# Patient Record
Sex: FEMALE | URBAN - METROPOLITAN AREA
[De-identification: names, ages, dates, MRNs, and addresses within clinical notes are randomized per-mention and may not be internally consistent; named-entity substitution may affect disease eponyms.]

---

## 2018-10-31 ENCOUNTER — INPATIENT (INPATIENT)
Facility: HOSPITAL | Age: 73
LOS: 0 days | Discharge: HOME | DRG: 287 | End: 2018-11-01
Attending: INTERNAL MEDICINE | Admitting: INTERNAL MEDICINE
Payer: MEDICAID

## 2018-10-31 VITALS
HEART RATE: 80 BPM | SYSTOLIC BLOOD PRESSURE: 179 MMHG | DIASTOLIC BLOOD PRESSURE: 70 MMHG | OXYGEN SATURATION: 100 % | TEMPERATURE: 98 F | RESPIRATION RATE: 18 BRPM

## 2018-10-31 DIAGNOSIS — R07.9 CHEST PAIN, UNSPECIFIED: ICD-10-CM

## 2018-10-31 DIAGNOSIS — H27.113 SUBLUXATION OF LENS, BILATERAL: Chronic | ICD-10-CM

## 2018-10-31 DIAGNOSIS — Z90.49 ACQUIRED ABSENCE OF OTHER SPECIFIED PARTS OF DIGESTIVE TRACT: Chronic | ICD-10-CM

## 2018-10-31 LAB
ALBUMIN SERPL ELPH-MCNC: 4.2 G/DL — SIGNIFICANT CHANGE UP (ref 3.3–5)
ALP SERPL-CCNC: 86 U/L — SIGNIFICANT CHANGE UP (ref 40–120)
ALT FLD-CCNC: 24 U/L — SIGNIFICANT CHANGE UP (ref 10–45)
ANION GAP SERPL CALC-SCNC: 13 MMOL/L — SIGNIFICANT CHANGE UP (ref 5–17)
APTT BLD: 29.5 SEC — SIGNIFICANT CHANGE UP (ref 27.5–36.3)
AST SERPL-CCNC: 21 U/L — SIGNIFICANT CHANGE UP (ref 10–40)
BASOPHILS # BLD AUTO: 0.1 K/UL — SIGNIFICANT CHANGE UP (ref 0–0.2)
BASOPHILS NFR BLD AUTO: 0.8 % — SIGNIFICANT CHANGE UP (ref 0–2)
BILIRUB SERPL-MCNC: 0.4 MG/DL — SIGNIFICANT CHANGE UP (ref 0.2–1.2)
BLD GP AB SCN SERPL QL: NEGATIVE — SIGNIFICANT CHANGE UP
BUN SERPL-MCNC: 11 MG/DL — SIGNIFICANT CHANGE UP (ref 7–23)
CALCIUM SERPL-MCNC: 10.1 MG/DL — SIGNIFICANT CHANGE UP (ref 8.4–10.5)
CHLORIDE SERPL-SCNC: 96 MMOL/L — SIGNIFICANT CHANGE UP (ref 96–108)
CO2 SERPL-SCNC: 24 MMOL/L — SIGNIFICANT CHANGE UP (ref 22–31)
CREAT SERPL-MCNC: 0.75 MG/DL — SIGNIFICANT CHANGE UP (ref 0.5–1.3)
D DIMER BLD IA.RAPID-MCNC: 157 NG/ML DDU — SIGNIFICANT CHANGE UP
EOSINOPHIL # BLD AUTO: 0.2 K/UL — SIGNIFICANT CHANGE UP (ref 0–0.5)
EOSINOPHIL NFR BLD AUTO: 1.8 % — SIGNIFICANT CHANGE UP (ref 0–6)
GAS PNL BLDV: SIGNIFICANT CHANGE UP
GLUCOSE SERPL-MCNC: 183 MG/DL — HIGH (ref 70–99)
HCT VFR BLD CALC: 33.2 % — LOW (ref 34.5–45)
HGB BLD-MCNC: 11.1 G/DL — LOW (ref 11.5–15.5)
INR BLD: 1.04 RATIO — SIGNIFICANT CHANGE UP (ref 0.88–1.16)
LYMPHOCYTES # BLD AUTO: 2 K/UL — SIGNIFICANT CHANGE UP (ref 1–3.3)
LYMPHOCYTES # BLD AUTO: 20.6 % — SIGNIFICANT CHANGE UP (ref 13–44)
MCHC RBC-ENTMCNC: 26.9 PG — LOW (ref 27–34)
MCHC RBC-ENTMCNC: 33.4 GM/DL — SIGNIFICANT CHANGE UP (ref 32–36)
MCV RBC AUTO: 80.6 FL — SIGNIFICANT CHANGE UP (ref 80–100)
MONOCYTES # BLD AUTO: 0.7 K/UL — SIGNIFICANT CHANGE UP (ref 0–0.9)
MONOCYTES NFR BLD AUTO: 7.2 % — SIGNIFICANT CHANGE UP (ref 2–14)
NEUTROPHILS # BLD AUTO: 6.6 K/UL — SIGNIFICANT CHANGE UP (ref 1.8–7.4)
NEUTROPHILS NFR BLD AUTO: 69.6 % — SIGNIFICANT CHANGE UP (ref 43–77)
PLATELET # BLD AUTO: 363 K/UL — SIGNIFICANT CHANGE UP (ref 150–400)
POTASSIUM SERPL-MCNC: 3.9 MMOL/L — SIGNIFICANT CHANGE UP (ref 3.5–5.3)
POTASSIUM SERPL-SCNC: 3.9 MMOL/L — SIGNIFICANT CHANGE UP (ref 3.5–5.3)
PROT SERPL-MCNC: 7.9 G/DL — SIGNIFICANT CHANGE UP (ref 6–8.3)
PROTHROM AB SERPL-ACNC: 11.9 SEC — SIGNIFICANT CHANGE UP (ref 10–12.9)
RBC # BLD: 4.12 M/UL — SIGNIFICANT CHANGE UP (ref 3.8–5.2)
RBC # FLD: 13.8 % — SIGNIFICANT CHANGE UP (ref 10.3–14.5)
RH IG SCN BLD-IMP: POSITIVE — SIGNIFICANT CHANGE UP
SODIUM SERPL-SCNC: 133 MMOL/L — LOW (ref 135–145)
TROPONIN T, HIGH SENSITIVITY RESULT: <6 NG/L — SIGNIFICANT CHANGE UP (ref 0–51)
WBC # BLD: 9.5 K/UL — SIGNIFICANT CHANGE UP (ref 3.8–10.5)
WBC # FLD AUTO: 9.5 K/UL — SIGNIFICANT CHANGE UP (ref 3.8–10.5)

## 2018-10-31 PROCEDURE — 99285 EMERGENCY DEPT VISIT HI MDM: CPT

## 2018-10-31 PROCEDURE — 71045 X-RAY EXAM CHEST 1 VIEW: CPT | Mod: 26

## 2018-10-31 PROCEDURE — 99223 1ST HOSP IP/OBS HIGH 75: CPT

## 2018-10-31 RX ORDER — GLUCAGON INJECTION, SOLUTION 0.5 MG/.1ML
1 INJECTION, SOLUTION SUBCUTANEOUS ONCE
Qty: 0 | Refills: 0 | Status: DISCONTINUED | OUTPATIENT
Start: 2018-10-31 | End: 2018-11-01

## 2018-10-31 RX ORDER — DEXTROSE 50 % IN WATER 50 %
25 SYRINGE (ML) INTRAVENOUS ONCE
Qty: 0 | Refills: 0 | Status: DISCONTINUED | OUTPATIENT
Start: 2018-10-31 | End: 2018-11-01

## 2018-10-31 RX ORDER — INSULIN LISPRO 100/ML
VIAL (ML) SUBCUTANEOUS AT BEDTIME
Qty: 0 | Refills: 0 | Status: DISCONTINUED | OUTPATIENT
Start: 2018-10-31 | End: 2018-11-01

## 2018-10-31 RX ORDER — DEXTROSE 50 % IN WATER 50 %
15 SYRINGE (ML) INTRAVENOUS ONCE
Qty: 0 | Refills: 0 | Status: DISCONTINUED | OUTPATIENT
Start: 2018-10-31 | End: 2018-11-01

## 2018-10-31 RX ORDER — DEXTROSE 50 % IN WATER 50 %
12.5 SYRINGE (ML) INTRAVENOUS ONCE
Qty: 0 | Refills: 0 | Status: DISCONTINUED | OUTPATIENT
Start: 2018-10-31 | End: 2018-11-01

## 2018-10-31 RX ORDER — INSULIN LISPRO 100/ML
VIAL (ML) SUBCUTANEOUS
Qty: 0 | Refills: 0 | Status: DISCONTINUED | OUTPATIENT
Start: 2018-10-31 | End: 2018-11-01

## 2018-10-31 RX ORDER — ASPIRIN/CALCIUM CARB/MAGNESIUM 324 MG
162 TABLET ORAL ONCE
Qty: 0 | Refills: 0 | Status: COMPLETED | OUTPATIENT
Start: 2018-10-31 | End: 2018-10-31

## 2018-10-31 RX ORDER — SODIUM CHLORIDE 9 MG/ML
1000 INJECTION, SOLUTION INTRAVENOUS
Qty: 0 | Refills: 0 | Status: DISCONTINUED | OUTPATIENT
Start: 2018-10-31 | End: 2018-11-01

## 2018-10-31 RX ADMIN — Medication 162 MILLIGRAM(S): at 22:28

## 2018-10-31 NOTE — ED PROVIDER NOTE - ATTENDING CONTRIBUTION TO CARE
Private Physician PADDY OMALLEY  73y female pmh DM, Gerd, HTN,HLD,Hypothryodism,Glaucoma SP mary lou, sp miroslava cataract, Pt comes to ed complains of chest pain left chest rad to left shoulder, Onset this evening. Pain 6/10. Worse with walking with assocated weakness,and shortness of breath,intermittant palps,. No fever,chills, nausea and vomiting, abd pain, diaphroesis. Has had similar pains in past month referred to cards not yet seen. Pain worse with deep resp. palp. PE WDWN female awake alert normocephalic atraumatic chest clear anterior & posterior min ttp left chsetabd soft +bs no mas guarding neuro no  focal defects  Antonio Gardner MD, Facep

## 2018-10-31 NOTE — H&P ADULT - HISTORY OF PRESENT ILLNESS
74 yo woman with PMH of HTN, HLD, DMT2, hypothyroidism, GERD, glaucoma presenting with left sided chest pain with exertional shortness of breath. Patient states chest pain is left side describes as heaviness with radiation to the left arm and numbness of the left hand fingertips. Patient states last episode happened ~7:30 pm and was sitting at the time. Episode lasted ~10 minutes. Endorses associated shortness of breath. Denies pleuritic chest pain, diaphoresis, nausea/vomiting during event. Patient states that  has had symptoms of intermittent chest pain and dyspnea on exertion for ~1-2 months but has become more frequent. Also endorses feeling weaker and intermittent dizziness. Patient has and outpatient exercise stress test on ~4 days ago and did not receive results. She does endorse that she had to stop the test short.

## 2018-10-31 NOTE — H&P ADULT - NEUROLOGICAL DETAILS
sensation intact/cranial nerves intact/alert and oriented x 3/responds to pain/normal strength/responds to verbal commands

## 2018-10-31 NOTE — H&P ADULT - NSHPPHYSICALEXAM_GEN_ALL_CORE
Vital Signs Last 24 Hrs  T(C): 36.4 (31 Oct 2018 20:58), Max: 36.4 (31 Oct 2018 20:58)  T(F): 97.6 (31 Oct 2018 20:58), Max: 97.6 (31 Oct 2018 20:58)  HR: 80 (31 Oct 2018 20:58) (80 - 80)  BP: 179/70 (31 Oct 2018 20:58) (179/70 - 179/70)  BP(mean): --  RR: 18 (31 Oct 2018 20:58) (18 - 18)  SpO2: 100% (31 Oct 2018 20:58) (100% - 100%)

## 2018-10-31 NOTE — H&P ADULT - ASSESSMENT
72 yo woman with PMH of HTN, HLD, DMT2, hypothyroidism, GERD, glaucoma presenting with left sided chest pain with exertional shortness of breath.

## 2018-10-31 NOTE — ED ADULT NURSE NOTE - NSIMPLEMENTINTERV_GEN_ALL_ED
Implemented All Universal Safety Interventions:  [place] to call system. Call bell, personal items and telephone within reach. Instruct patient to call for assistance. Room bathroom lighting operational. Non-slip footwear when patient is off stretcher. Physically safe environment: no spills, clutter or unnecessary equipment. Stretcher in lowest position, wheels locked, appropriate side rails in place.

## 2018-10-31 NOTE — ED PROVIDER NOTE - PHYSICAL EXAMINATION
EM PGY1 Nicole Vigil MD:   CONSTITUTIONAL: Nontoxic, well nourished, well developed, elderly female, resting comfortably flat in stretcher in no acute distress  HEAD: Normocephalic; atraumatic  EYES: Normal inspection, EOMI  ENMT: External appears normal; normal oropharynx  NECK: Supple; non-tender; no cervical lymphadenopathy  CARD: RRR; no audible murmurs, rubs, or gallops  RESP: No respiratory distress, lungs ctab/l  ABD: Soft, non-distended; non-tender; no rebound or guarding  EXT: No LE pitting edema or calf tenderness; distal pulses intact with good capillary refill  SKIN: Warm, dry, intact  NEURO: aaox3, 5/5 strength b/l UE and LE, no gross motor or sensory defects noted

## 2018-10-31 NOTE — ED PROVIDER NOTE - MEDICAL DECISION MAKING DETAILS
CP ro acs check ekg, labs trop, xry, re-assess  Antonio Gardner MD, Facep CP ro acs check ekg, labs trop, xry, re-assess  Antonio Gardner MD, Facep    EM PGY1 Nicole Vigil MD: 72 yo female with PMH of DM, HLD, HTN, GERD, hypothyroidism who presents with chest pain for 3 months. Pt is hypertensive in ED, afebrile, not tachycardic. EKG normal. Pt has cardiac risk factors with concerning CP and exertional dyspnea. Low risk for PE. Plan: labs, troponin, CXR, d-dimer, aspirin

## 2018-10-31 NOTE — ED PROVIDER NOTE - PROGRESS NOTE DETAILS
Discussed with Dr Persaud pt was planning to have cath at [facility] next week. Req tba to him and he will consult cards  Antonio Gardner MD, [facility]

## 2018-10-31 NOTE — H&P ADULT - PROBLEM SELECTOR PLAN 2
Per patient reported last HgbA1c 8.1. Yet has noticed that glucose levels at home has been elevated.   Hold oral medications  Corrective SSI  Monitor FS  Hypoglycemia protocol  Recheck A1c

## 2018-10-31 NOTE — ED PROVIDER NOTE - OBJECTIVE STATEMENT
EM PGY1 Nicole Vigil MD: 72 yo female with PMH of DM, HLD, HTN, GERD, hypothyroidism who presents with chest pain for 3 months. CP is in left side, 7/10 worsening today from 5/10 normally, heaviness, radiates to the left arm. Pt also endorses exertional SOB. worse today, with fatigue, dizziness, weakness. Pt did not take baby asa today. Father  at 45 from possible MI. Never had stress testing before. No diaphoresis, N/V, syncope, irregular rhythm, palpitations, or focal neuro deficit, recent travel, leg swelling.     PCP: Young Persaud

## 2018-10-31 NOTE — ED ADULT NURSE NOTE - OBJECTIVE STATEMENT
73 y.o F presents to the ED from home c/o chest pain. Patient is awake, alert and speaking coherently. As per patient she has had left sided chest pain that sometimes radiates down her left arm for approximately 1 month; states she sometimes feels nauseous. patient reports going to her PCP but has not yet follow up with cardiology. Patient presents A&ox3, afebrile and ambulatory; denies numbness and tingling, endorses dizziness but denies headache, denies SOB, lungs clear; abdomen soft, nontender and nondistended, denies vomiting and diarrhea.

## 2018-10-31 NOTE — H&P ADULT - RS GEN PE MLT RESP DETAILS PC
clear to auscultation bilaterally/no rales/airway patent/breath sounds equal/good air movement/no rhonchi/no wheezes

## 2018-10-31 NOTE — H&P ADULT - NSHPLABSRESULTS_GEN_ALL_CORE
Personally reviewed labs and noted in detail below    Personally reviewed EKG SR 73 no appreciable ST segment changes    Personally reviewed CXR: no appreciable consolidation or signs of pulm edema

## 2018-10-31 NOTE — H&P ADULT - PROBLEM SELECTOR PLAN 1
Patient currently without symptoms. Currently CE negative and EKG without acute changes   Concern for abnormal stress test.  Per Dr. Gardner (of ED): spoke with Dr. Persaud and patient planning to have cath at [facility] next week. Dr. Persaud will consult cards  Monitor on tele  Trend CE  Check TTE  C/W Aspirin 81  Check A1c, Lipid panel TSH

## 2018-10-31 NOTE — ED PROVIDER NOTE - PMH
DM (diabetes mellitus)    GERD (gastroesophageal reflux disease)    Glaucoma    HLD (hyperlipidemia)    HTN (hypertension)    Hypothyroid

## 2018-10-31 NOTE — ED PROVIDER NOTE - NS ED ROS FT
EM PGY1 Nicole Vigil MD:   General: denies fever, chills  HENT: denies nasal congestion, sore throat, rhinorrhea  Eyes: denies vision changes  CV: +chest pain  Resp: +difficulty breathing, denies cough  Abdominal: denies nausea, vomiting, diarrhea, abdominal pain, blood in stool, dark stool  : denies pain with urination  MSK: denies recent trauma  Neuro: denies headaches, numbness, tingling, +dizziness,+ lightheadedness.  Skin: denies new rashes  Endocrine: denies recent weight loss

## 2018-10-31 NOTE — H&P ADULT - ATTENDING COMMENTS
Dr. Young Persaud accepted patient's case from the ED and requested in house hospitalist team to complete admission. Patient was previously unknown to me. Patient was assigned to me by hospitalist in charge. My involvement in this case consisted of initial history, physical and management plan. Dr. Persaud to assume care in AM. Case discussed in detail with overnight medicine NP/PA Dr. Young Persaud accepted patient's case from the ED and requested in house hospitalist team to complete admission. Patient was previously unknown to me. Patient was assigned to me by hospitalist in charge. My involvement in this case consisted of initial history, physical and management plan. Dr. Persaud to assume care in AM. Case discussed in detail with overnight medicine NP/PA, [id number]

## 2018-11-01 ENCOUNTER — INBOUND DOCUMENT (OUTPATIENT)
Age: 73
End: 2018-11-01

## 2018-11-01 ENCOUNTER — TRANSCRIPTION ENCOUNTER (OUTPATIENT)
Age: 73
End: 2018-11-01

## 2018-11-01 VITALS
RESPIRATION RATE: 18 BRPM | TEMPERATURE: 98 F | DIASTOLIC BLOOD PRESSURE: 55 MMHG | SYSTOLIC BLOOD PRESSURE: 123 MMHG | HEART RATE: 64 BPM | OXYGEN SATURATION: 100 %

## 2018-11-01 DIAGNOSIS — K21.9 GASTRO-ESOPHAGEAL REFLUX DISEASE WITHOUT ESOPHAGITIS: ICD-10-CM

## 2018-11-01 DIAGNOSIS — H40.9 UNSPECIFIED GLAUCOMA: ICD-10-CM

## 2018-11-01 DIAGNOSIS — E78.5 HYPERLIPIDEMIA, UNSPECIFIED: ICD-10-CM

## 2018-11-01 DIAGNOSIS — Z29.9 ENCOUNTER FOR PROPHYLACTIC MEASURES, UNSPECIFIED: ICD-10-CM

## 2018-11-01 DIAGNOSIS — E11.9 TYPE 2 DIABETES MELLITUS WITHOUT COMPLICATIONS: ICD-10-CM

## 2018-11-01 DIAGNOSIS — R07.9 CHEST PAIN, UNSPECIFIED: ICD-10-CM

## 2018-11-01 DIAGNOSIS — I10 ESSENTIAL (PRIMARY) HYPERTENSION: ICD-10-CM

## 2018-11-01 DIAGNOSIS — E03.9 HYPOTHYROIDISM, UNSPECIFIED: ICD-10-CM

## 2018-11-01 LAB
ANION GAP SERPL CALC-SCNC: 12 MMOL/L — SIGNIFICANT CHANGE UP (ref 5–17)
BUN SERPL-MCNC: 11 MG/DL — SIGNIFICANT CHANGE UP (ref 7–23)
CALCIUM SERPL-MCNC: 9.1 MG/DL — SIGNIFICANT CHANGE UP (ref 8.4–10.5)
CHLORIDE SERPL-SCNC: 103 MMOL/L — SIGNIFICANT CHANGE UP (ref 96–108)
CHOLEST SERPL-MCNC: 65 MG/DL — SIGNIFICANT CHANGE UP (ref 10–199)
CK MB CFR SERPL CALC: 1 NG/ML — SIGNIFICANT CHANGE UP (ref 0–3.8)
CK SERPL-CCNC: 33 U/L — SIGNIFICANT CHANGE UP (ref 25–170)
CO2 SERPL-SCNC: 21 MMOL/L — LOW (ref 22–31)
CREAT SERPL-MCNC: 0.67 MG/DL — SIGNIFICANT CHANGE UP (ref 0.5–1.3)
GLUCOSE BLDC GLUCOMTR-MCNC: 105 MG/DL — HIGH (ref 70–99)
GLUCOSE BLDC GLUCOMTR-MCNC: 162 MG/DL — HIGH (ref 70–99)
GLUCOSE BLDC GLUCOMTR-MCNC: 191 MG/DL — HIGH (ref 70–99)
GLUCOSE SERPL-MCNC: 113 MG/DL — HIGH (ref 70–99)
HBA1C BLD-MCNC: 7.8 % — HIGH (ref 4–5.6)
HCT VFR BLD CALC: 30.1 % — LOW (ref 34.5–45)
HDLC SERPL-MCNC: 26 MG/DL — LOW
HGB BLD-MCNC: 9.9 G/DL — LOW (ref 11.5–15.5)
LIPID PNL WITH DIRECT LDL SERPL: 22 MG/DL — SIGNIFICANT CHANGE UP
MAGNESIUM SERPL-MCNC: 1.6 MG/DL — SIGNIFICANT CHANGE UP (ref 1.6–2.6)
MCHC RBC-ENTMCNC: 26.4 PG — LOW (ref 27–34)
MCHC RBC-ENTMCNC: 32.9 GM/DL — SIGNIFICANT CHANGE UP (ref 32–36)
MCV RBC AUTO: 80.3 FL — SIGNIFICANT CHANGE UP (ref 80–100)
PHOSPHATE SERPL-MCNC: 3.9 MG/DL — SIGNIFICANT CHANGE UP (ref 2.5–4.5)
PLATELET # BLD AUTO: 296 K/UL — SIGNIFICANT CHANGE UP (ref 150–400)
POTASSIUM SERPL-MCNC: 4 MMOL/L — SIGNIFICANT CHANGE UP (ref 3.5–5.3)
POTASSIUM SERPL-SCNC: 4 MMOL/L — SIGNIFICANT CHANGE UP (ref 3.5–5.3)
RBC # BLD: 3.75 M/UL — LOW (ref 3.8–5.2)
RBC # FLD: 15.6 % — HIGH (ref 10.3–14.5)
SODIUM SERPL-SCNC: 136 MMOL/L — SIGNIFICANT CHANGE UP (ref 135–145)
TOTAL CHOLESTEROL/HDL RATIO MEASUREMENT: 2.5 RATIO — LOW (ref 3.3–7.1)
TRIGL SERPL-MCNC: 87 MG/DL — SIGNIFICANT CHANGE UP (ref 10–149)
TROPONIN T, HIGH SENSITIVITY RESULT: <6 NG/L — SIGNIFICANT CHANGE UP (ref 0–51)
TROPONIN T, HIGH SENSITIVITY RESULT: <6 NG/L — SIGNIFICANT CHANGE UP (ref 0–51)
TSH SERPL-MCNC: 5.58 UIU/ML — HIGH (ref 0.27–4.2)
WBC # BLD: 6.6 K/UL — SIGNIFICANT CHANGE UP (ref 3.8–10.5)
WBC # FLD AUTO: 6.6 K/UL — SIGNIFICANT CHANGE UP (ref 3.8–10.5)

## 2018-11-01 PROCEDURE — C1894: CPT

## 2018-11-01 PROCEDURE — 84100 ASSAY OF PHOSPHORUS: CPT

## 2018-11-01 PROCEDURE — 99285 EMERGENCY DEPT VISIT HI MDM: CPT

## 2018-11-01 PROCEDURE — 93306 TTE W/DOPPLER COMPLETE: CPT

## 2018-11-01 PROCEDURE — 86850 RBC ANTIBODY SCREEN: CPT

## 2018-11-01 PROCEDURE — 84484 ASSAY OF TROPONIN QUANT: CPT

## 2018-11-01 PROCEDURE — 82553 CREATINE MB FRACTION: CPT

## 2018-11-01 PROCEDURE — C1887: CPT

## 2018-11-01 PROCEDURE — 80061 LIPID PANEL: CPT

## 2018-11-01 PROCEDURE — 93458 L HRT ARTERY/VENTRICLE ANGIO: CPT

## 2018-11-01 PROCEDURE — 82330 ASSAY OF CALCIUM: CPT

## 2018-11-01 PROCEDURE — 86901 BLOOD TYPING SEROLOGIC RH(D): CPT

## 2018-11-01 PROCEDURE — 85379 FIBRIN DEGRADATION QUANT: CPT

## 2018-11-01 PROCEDURE — 80048 BASIC METABOLIC PNL TOTAL CA: CPT

## 2018-11-01 PROCEDURE — 83735 ASSAY OF MAGNESIUM: CPT

## 2018-11-01 PROCEDURE — 93306 TTE W/DOPPLER COMPLETE: CPT | Mod: 26

## 2018-11-01 PROCEDURE — 84295 ASSAY OF SERUM SODIUM: CPT

## 2018-11-01 PROCEDURE — 80053 COMPREHEN METABOLIC PANEL: CPT

## 2018-11-01 PROCEDURE — 71045 X-RAY EXAM CHEST 1 VIEW: CPT

## 2018-11-01 PROCEDURE — 85610 PROTHROMBIN TIME: CPT

## 2018-11-01 PROCEDURE — 85027 COMPLETE CBC AUTOMATED: CPT

## 2018-11-01 PROCEDURE — 99152 MOD SED SAME PHYS/QHP 5/>YRS: CPT

## 2018-11-01 PROCEDURE — 85730 THROMBOPLASTIN TIME PARTIAL: CPT

## 2018-11-01 PROCEDURE — 99254 IP/OBS CNSLTJ NEW/EST MOD 60: CPT

## 2018-11-01 PROCEDURE — 93458 L HRT ARTERY/VENTRICLE ANGIO: CPT | Mod: 26,GC

## 2018-11-01 PROCEDURE — 82550 ASSAY OF CK (CPK): CPT

## 2018-11-01 PROCEDURE — 82803 BLOOD GASES ANY COMBINATION: CPT

## 2018-11-01 PROCEDURE — 82565 ASSAY OF CREATININE: CPT

## 2018-11-01 PROCEDURE — 82435 ASSAY OF BLOOD CHLORIDE: CPT

## 2018-11-01 PROCEDURE — 82947 ASSAY GLUCOSE BLOOD QUANT: CPT

## 2018-11-01 PROCEDURE — 82962 GLUCOSE BLOOD TEST: CPT

## 2018-11-01 PROCEDURE — 83036 HEMOGLOBIN GLYCOSYLATED A1C: CPT

## 2018-11-01 PROCEDURE — 86900 BLOOD TYPING SEROLOGIC ABO: CPT

## 2018-11-01 PROCEDURE — 83605 ASSAY OF LACTIC ACID: CPT

## 2018-11-01 PROCEDURE — 99152 MOD SED SAME PHYS/QHP 5/>YRS: CPT | Mod: GC

## 2018-11-01 PROCEDURE — 84443 ASSAY THYROID STIM HORMONE: CPT

## 2018-11-01 PROCEDURE — 84132 ASSAY OF SERUM POTASSIUM: CPT

## 2018-11-01 PROCEDURE — 85014 HEMATOCRIT: CPT

## 2018-11-01 PROCEDURE — C1769: CPT

## 2018-11-01 RX ORDER — FAMOTIDINE 10 MG/ML
1 INJECTION INTRAVENOUS
Qty: 0 | Refills: 0 | COMMUNITY

## 2018-11-01 RX ORDER — AMLODIPINE BESYLATE 2.5 MG/1
10 TABLET ORAL DAILY
Qty: 0 | Refills: 0 | Status: DISCONTINUED | OUTPATIENT
Start: 2018-11-01 | End: 2018-11-01

## 2018-11-01 RX ORDER — LATANOPROST 0.05 MG/ML
1 SOLUTION/ DROPS OPHTHALMIC; TOPICAL AT BEDTIME
Qty: 0 | Refills: 0 | Status: DISCONTINUED | OUTPATIENT
Start: 2018-11-01 | End: 2018-11-01

## 2018-11-01 RX ORDER — ATENOLOL 25 MG/1
50 TABLET ORAL DAILY
Qty: 0 | Refills: 0 | Status: DISCONTINUED | OUTPATIENT
Start: 2018-11-01 | End: 2018-11-01

## 2018-11-01 RX ORDER — FAMOTIDINE 10 MG/ML
40 INJECTION INTRAVENOUS DAILY
Qty: 0 | Refills: 0 | Status: DISCONTINUED | OUTPATIENT
Start: 2018-11-01 | End: 2018-11-01

## 2018-11-01 RX ORDER — ASPIRIN/CALCIUM CARB/MAGNESIUM 324 MG
81 TABLET ORAL DAILY
Qty: 0 | Refills: 0 | Status: DISCONTINUED | OUTPATIENT
Start: 2018-11-01 | End: 2018-11-01

## 2018-11-01 RX ORDER — METFORMIN HYDROCHLORIDE 850 MG/1
1 TABLET ORAL
Qty: 0 | Refills: 0 | COMMUNITY

## 2018-11-01 RX ORDER — ATORVASTATIN CALCIUM 80 MG/1
10 TABLET, FILM COATED ORAL AT BEDTIME
Qty: 0 | Refills: 0 | Status: DISCONTINUED | OUTPATIENT
Start: 2018-11-01 | End: 2018-11-01

## 2018-11-01 RX ADMIN — FAMOTIDINE 40 MILLIGRAM(S): 10 INJECTION INTRAVENOUS at 09:48

## 2018-11-01 RX ADMIN — Medication 1: at 11:24

## 2018-11-01 RX ADMIN — Medication 81 MILLIGRAM(S): at 09:48

## 2018-11-01 NOTE — DISCHARGE NOTE ADULT - PLAN OF CARE
Patient will be chest pain free Low salt, low fat diet.   Weight management.   Take medications as prescribed.    No smoking.  Follow up appointments with your doctor(s)  as instruced. Patient's glucose will be controlled Continue to follow with your primary care MD or your endocrinologist.  Follow a heart healthy diabetic diet. If you check your fingerstick glucose at home, call your MD if it is greater than 250mg/dL on 2 occasions or less than 100mg/dL on 2 occasions. Know signs of low blood sugar, such as: dizziness, shakiness, sweating, confusion, hunger, nervousness-drink 4 ounces apple juice if occurs and call your doctor. Know early signs of high blood sugar, such as: frequent urination, increased thirst, blurry vision, fatigue, headache - call your doctor if this occurs. Follow with other practitioners to care for your diabetes, such as ophthamologist and podiatrist. LDL<70 Goal is to keep LDL<70. Continue with your cholesterol medications as prescribed. Eat a heart healthy diet that is low in saturated fats and salt, and includes whole grains, fruits, vegetables and lean protein; exercise regularly (consult with your physician or cardiologist first); maintain a heart healthy weight; if you smoke - quit (A resource to help you stop smoking is the [facility] Tobacco Control – phone number [phone].). Continue to follow with your primary physician or cardiologist. Your blood pressure will be controlled. Continue with your blood pressure medications; eat a heart healthy diet with low salt diet; exercise regularly (consult with your physician or cardiologist first); maintain a heart healthy weight; if you smoke - quit (A resource to help you stop smoking is the [facility] for Tobacco Control – phone number [phone].); include healthy ways to manage stress. Continue to follow with your primary care physician or cardiologist.

## 2018-11-01 NOTE — DISCHARGE NOTE ADULT - CARE PROVIDER_API CALL
Jose Carlos Brown), Cardiovascular Disease; Interventional Cardiology  [address] [place] [address]  Phone: [phone]  Fax: [phone]    Young Persaud), Internal Medicine  [address] [place] [address]  Phone: [phone]  Fax: [phone]

## 2018-11-01 NOTE — DISCHARGE NOTE ADULT - PATIENT PORTAL LINK FT
You can access the [facility] Patient Portal, offered by [facility], by registering with the following website: [email or web address]

## 2018-11-01 NOTE — DISCHARGE NOTE ADULT - MEDICATION SUMMARY - MEDICATIONS TO TAKE
I will START or STAY ON the medications listed below when I get home from the hospital:    Aspirin Enteric Coated 81 mg oral delayed release tablet  -- 1 tab(s) by mouth once a day  -- Indication: For Chest pain    metFORMIN 850 mg oral tablet  -- 1 tab(s) by mouth 2 times a day, Restart on 11/3  -- Indication: For DM (diabetes mellitus)    glimepiride 2 mg oral tablet  -- 1-2 tab(s) by mouth once a day  -- Indication: For DM (diabetes mellitus)    atorvastatin 10 mg oral tablet  -- 1 tab(s) by mouth once a day  -- Indication: For HLD (hyperlipidemia)    atenolol 50 mg oral tablet  -- 1 tab(s) by mouth once a day  -- Indication: For HTN (hypertension)    amLODIPine 10 mg oral tablet  -- 1 tab(s) by mouth once a day  -- Indication: For HTN (hypertension)    Liquitears preserved ophthalmic solution  -- 1 drop(s) to each affected eye 3 times a day, As Needed  -- Indication: For Ophthalmic    latanoprost 0.005% ophthalmic solution  -- 1 drop(s) to each affected eye once a day (in the evening)  -- Indication: For Ophthalmic    Vitamin C 500 mg oral tablet  -- 1 tab(s) by mouth once a day  -- Indication: For Supplements    Vitamin B12 1000 mcg oral tablet  -- 1 tab(s) by mouth once a day  -- Indication: For Supplements

## 2018-11-01 NOTE — CONSULT NOTE ADULT - SUBJECTIVE AND OBJECTIVE BOX
MRN-[id number]    CHIEF COMPLAINT:  Chest Pain    HISTORY OF PRESENT ILLNESS:  ELLEN MOORE is a 73y Female patient with past medical history of HTN, HLD, DMT2, hypothyroidism, GERD, glaucoma presenting with left sided chest pain with exertional shortness of breath. Her functional status has declined over the past several month limited by shortness of breath. Her chest discomfort is sharp in quality and worsens iwth [id number]. She had a stress test within the last few days that was reportedly positive although the report itself is not available to me for review. She was planned for a cardiac cath in the outpatient setting, but given that she has worsening chest discomfort, she presented to the emergency department. Cardiac enzymes negative. ECG benign. Cardiology consulted for further management.     Allergies  Drug Allergies Not Recorded  eggs (Other)    PAST MEDICAL & SURGICAL HISTORY:  Glaucoma  GERD (gastroesophageal reflux disease)  Hypothyroid  HLD (hyperlipidemia)  HTN (hypertension)  DM (diabetes mellitus)  Subluxed cataract of both eyes  Status post cholecystectomy    FAMILY HISTORY:  Family history of heart attack (Father): father    SOCIAL HISTORY:    Non-smoker    REVIEW OF SYSTEMS:  CONSTITUTIONAL: No fever, weight loss, Positive fatigue  EYES: No eye pain, visual disturbances  ENMT:  No difficulty hearing, tinnitus, vertigo; No sinus or throat pain  NECK: No pain or stiffness  RESPIRATORY: No cough, wheezing, chills or hemoptysis; Positive Shortness of Breath  CARDIOVASCULAR: No palpitations, passing out, dizziness, or leg swelling. Positive chest pain  GASTROINTESTINAL: No abdominal or epigastric pain. No nausea, vomiting, or hematemesis  NEUROLOGICAL: No headaches, memory loss  SKIN: No itching, burning, rashes, or lesions   LYMPH Nodes: No enlarged glands  MUSCULOSKELETAL: No joint pain or swelling  PSYCHIATRIC: No depression, anxiety, mood swings, or difficulty sleeping  HEME/LYMPH: No easy bruising, or bleeding gums    I&O's Summary    31 Oct 2018 07:01  -  01 Nov 2018 06:30  --------------------------------------------------------  IN: 180 mL / OUT: 0 mL / NET: 180 mL    PHYSICAL EXAM:  Vital Signs Last 24 Hrs  T(C): 36.7 (01 Nov 2018 05:30), Max: 36.7 (01 Nov 2018 01:34)  T(F): 98.1 (01 Nov 2018 05:30), Max: 98.1 (01 Nov 2018 05:30)  HR: 57 (01 Nov 2018 05:30) (57 - 80)  BP: 108/56 (01 Nov 2018 05:30) (108/56 - 179/70)  RR: 18 (01 Nov 2018 05:30) (16 - 18)  SpO2: 100% (01 Nov 2018 05:30) (100% - 100%)    Appearance: Normal	  HEENT:   Normal oral mucosa	  Lymphatic: No lymphadenopathy  Cardiovascular: Normal S1 S2, No JVD, No murmurs, No edema  Respiratory: Lungs clear to auscultation	  Psychiatry: A & O x 3  Gastrointestinal:  Soft, Non-tender  Skin: No rashes, No ecchymoses, No cyanosis	  Neurologic: Non-focal  Extremities: No clubbing, cyanosis or edema  Vascular: Peripheral pulses palpable 2+ bilaterally    MEDICATIONS:  MEDICATIONS  (STANDING):  amLODIPine   Tablet 10 milliGRAM(s) Oral daily  aspirin enteric coated 81 milliGRAM(s) Oral daily  ATENolol  Tablet 50 milliGRAM(s) Oral daily  atorvastatin 10 milliGRAM(s) Oral at bedtime  dextrose 5%. 1000 milliLiter(s) (50 mL/Hr) IV Continuous <Continuous>  dextrose 50% Injectable 12.5 Gram(s) IV Push once  dextrose 50% Injectable 25 Gram(s) IV Push once  dextrose 50% Injectable 25 Gram(s) IV Push once  famotidine    Tablet 40 milliGRAM(s) Oral daily  insulin lispro (HumaLOG) corrective regimen sliding scale   SubCutaneous three times a day before meals  insulin lispro (HumaLOG) corrective regimen sliding scale   SubCutaneous at bedtime  latanoprost 0.005% Ophthalmic Solution 1 Drop(s) Both EYES at bedtime    MEDICATIONS  (PRN):  artificial tears (preservative free) Ophthalmic Solution 1 Drop(s) Both EYES two times a day PRN Dry Eyes  dextrose 40% Gel 15 Gram(s) Oral once PRN Blood Glucose LESS THAN 70 milliGRAM(s)/deciliter  glucagon  Injectable 1 milliGRAM(s) IntraMuscular once PRN Glucose LESS THAN 70 milligrams/deciliter    LABS:	 	  CBC Full  -  ( 31 Oct 2018 22:30 )  WBC Count : 9.5 K/uL  Hemoglobin : 11.1 g/dL  Hematocrit : 33.2 %  Platelet Count - Automated : 363 K/uL  Mean Cell Volume : 80.6 fl  Mean Cell Hemoglobin : 26.9 pg  Mean Cell Hemoglobin Concentration : 33.4 gm/dL  Auto Neutrophil # : 6.6 K/uL  Auto Lymphocyte # : 2.0 K/uL  Auto Monocyte # : 0.7 K/uL  Auto Eosinophil # : 0.2 K/uL  Auto Basophil # : 0.1 K/uL  Auto Neutrophil % : 69.6 %  Auto Lymphocyte % : 20.6 %  Auto Monocyte % : 7.2 %  Auto Eosinophil % : 1.8 %  Auto Basophil % : 0.8 %    10-31    133<L>  |  96  |  11  ----------------------------<  183<H>  3.9   |  24  |  0.75    Ca    10.1      31 Oct 2018 22:30    TPro  7.9  /  Alb  4.2  /  TBili  0.4  /  DBili  x   /  AST  21  /  ALT  24  /  AlkPhos  86  10-31    PT/INR - ( 31 Oct 2018 22:30 )   PT: 11.9 sec;   INR: 1.04 ratio      PTT - ( 31 Oct 2018 22:30 )  PTT:29.5 sec    TELEMETRY: NSR    ECG: NSR, normal axis, good r wave progression, septal infarct, age indeterminate    CARDIAC TESTING/STUDIES:    Stress Test:  Performed within the last week and reportedly positive although details are unknown to me. 	  	  ASSESSMENT/PLAN: 	  73y Female patient with past medical history of HTN, HLD, DMT2, hypothyroidism, GERD, glaucoma presenting with left sided chest pain with exertional shortness of breath.    1) Chest Pain   Abnormal Stress test  Negative cardiac enzymes.   Benign ECG   Chest pain history; chest pain free upon examination    ·	Plan for cardiac cath today.   ·	Continue medical management with aspirin 81 mg daily, atorvastatin 10 mg nightly.    2) HTN   Well controlled.     ·	Continue medical management with amlodipine 10 mg daily (recently increased), atenolol 50 mg daily.     Marques Mohan MD, MPH, SORAIDA  Cardiovascular Specialist Attending  [facility]  C: [phone]  E: [email or web address]  (Cardiology Nocturnist cell number available 7 pm - 7 am every night; available daytime week days for follow-up only; daytime weekends covered by general cardiology consult service)

## 2018-11-01 NOTE — DISCHARGE NOTE ADULT - CARE PLAN
Principal Discharge DX:	Chest pain  Goal:	Patient will be chest pain free  Assessment and plan of treatment:	Low salt, low fat diet.   Weight management.   Take medications as prescribed.    No smoking.  Follow up appointments with your doctor(s)  as instruced.  Secondary Diagnosis:	DM (diabetes mellitus)  Goal:	Patient's glucose will be controlled  Assessment and plan of treatment:	Continue to follow with your primary care MD or your endocrinologist.  Follow a heart healthy diabetic diet. If you check your fingerstick glucose at home, call your MD if it is greater than 250mg/dL on 2 occasions or less than 100mg/dL on 2 occasions. Know signs of low blood sugar, such as: dizziness, shakiness, sweating, confusion, hunger, nervousness-drink 4 ounces apple juice if occurs and call your doctor. Know early signs of high blood sugar, such as: frequent urination, increased thirst, blurry vision, fatigue, headache - call your doctor if this occurs. Follow with other practitioners to care for your diabetes, such as ophthamologist and podiatrist.  Secondary Diagnosis:	HLD (hyperlipidemia)  Goal:	LDL<70  Assessment and plan of treatment:	Goal is to keep LDL<70. Continue with your cholesterol medications as prescribed. Eat a heart healthy diet that is low in saturated fats and salt, and includes whole grains, fruits, vegetables and lean protein; exercise regularly (consult with your physician or cardiologist first); maintain a heart healthy weight; if you smoke - quit (A resource to help you stop smoking is the [facility] [employer] for [employer] Control – phone number [phone].). Continue to follow with your primary physician or cardiologist.  Secondary Diagnosis:	HTN (hypertension)  Goal:	Your blood pressure will be controlled.  Assessment and plan of treatment:	Continue with your blood pressure medications; eat a heart healthy diet with low salt diet; exercise regularly (consult with your physician or cardiologist first); maintain a heart healthy weight; if you smoke - quit (A resource to help you stop smoking is the [facility] [employer] for Tobacco Control – phone number [phone].); include healthy ways to manage stress. Continue to follow with your primary care physician or cardiologist.

## 2018-11-01 NOTE — DISCHARGE NOTE ADULT - HOSPITAL COURSE
HPI:  72 yo woman with PMH of HTN, HLD, DMT2, hypothyroidism, GERD, glaucoma presenting with left sided chest pain with exertional shortness of breath. Patient states chest pain is left side describes as heaviness with radiation to the left arm and numbness of the left hand fingertips. Patient states last episode happened ~7:30 pm and was sitting at the time. Episode lasted ~10 minutes. Endorses associated shortness of breath. Denies pleuritic chest pain, diaphoresis, nausea/vomiting during event. Patient states that  has had symptoms of intermittent chest pain and dyspnea on exertion for ~1-2 months but has become more frequent. Also endorses feeling weaker and intermittent dizziness. Patient has and outpatient exercise stress test on ~4 days ago and did not receive results. She does endorse that she had to stop the test short. (31 Oct 2018 23:37) HPI:  74 yo woman with PMH of HTN, HLD, DMT2, hypothyroidism, GERD, glaucoma presenting with left sided chest pain with exertional shortness of breath. Patient states chest pain is left side describes as heaviness with radiation to the left arm and numbness of the left hand fingertips. Patient states last episode happened ~7:30 pm and was sitting at the time. Episode lasted ~10 minutes. Endorses associated shortness of breath. Denies pleuritic chest pain, diaphoresis, nausea/vomiting during event. Patient states that  has had symptoms of intermittent chest pain and dyspnea on exertion for ~1-2 months but has become more frequent. Also endorses feeling weaker and intermittent dizziness. Patient has and outpatient exercise stress test on ~4 days ago and did not receive results. She does endorse that she had to stop the test short. (31 Oct 2018 23:37)  s/p diagnostic cath with Normal coronaries. pending TTE result

## 2018-11-01 NOTE — DISCHARGE NOTE ADULT - CARE PROVIDERS DIRECT ADDRESSES
[email or web address]

## 2018-11-06 PROBLEM — Z00.00 ENCOUNTER FOR PREVENTIVE HEALTH EXAMINATION: Status: ACTIVE | Noted: 2018-11-06

## 2019-05-20 PROBLEM — E11.9 TYPE 2 DIABETES MELLITUS WITHOUT COMPLICATIONS: Chronic | Status: ACTIVE | Noted: 2018-10-31

## 2019-05-20 PROBLEM — E03.9 HYPOTHYROIDISM, UNSPECIFIED: Chronic | Status: ACTIVE | Noted: 2018-10-31

## 2019-05-20 PROBLEM — H40.9 UNSPECIFIED GLAUCOMA: Chronic | Status: ACTIVE | Noted: 2018-10-31

## 2019-05-20 PROBLEM — I10 ESSENTIAL (PRIMARY) HYPERTENSION: Chronic | Status: ACTIVE | Noted: 2018-10-31

## 2019-05-20 PROBLEM — K21.9 GASTRO-ESOPHAGEAL REFLUX DISEASE WITHOUT ESOPHAGITIS: Chronic | Status: ACTIVE | Noted: 2018-10-31

## 2019-05-20 PROBLEM — E78.5 HYPERLIPIDEMIA, UNSPECIFIED: Chronic | Status: ACTIVE | Noted: 2018-10-31

## 2019-05-30 ENCOUNTER — APPOINTMENT (OUTPATIENT)
Dept: ORTHOPEDIC SURGERY | Facility: CLINIC | Age: 74
End: 2019-05-30

## 2021-10-05 ENCOUNTER — EMERGENCY (EMERGENCY)
Facility: HOSPITAL | Age: 76
LOS: 1 days | Discharge: HOME | End: 2021-10-05
Attending: CLINIC/CENTER
Payer: MEDICAID

## 2021-10-05 VITALS
TEMPERATURE: 97 F | SYSTOLIC BLOOD PRESSURE: 207 MMHG | HEART RATE: 73 BPM | DIASTOLIC BLOOD PRESSURE: 116 MMHG | OXYGEN SATURATION: 100 % | RESPIRATION RATE: 18 BRPM | HEIGHT: 62 IN | WEIGHT: 130.07 LBS

## 2021-10-05 DIAGNOSIS — H27.113 SUBLUXATION OF LENS, BILATERAL: Chronic | ICD-10-CM

## 2021-10-05 DIAGNOSIS — Z90.49 ACQUIRED ABSENCE OF OTHER SPECIFIED PARTS OF DIGESTIVE TRACT: Chronic | ICD-10-CM

## 2021-10-05 LAB
ACANTHOCYTES BLD QL SMEAR: SLIGHT — SIGNIFICANT CHANGE UP
ALBUMIN SERPL ELPH-MCNC: 4.3 G/DL — SIGNIFICANT CHANGE UP (ref 3.3–5)
ALP SERPL-CCNC: 90 U/L — SIGNIFICANT CHANGE UP (ref 40–120)
ALT FLD-CCNC: 18 U/L — SIGNIFICANT CHANGE UP (ref 10–45)
ANION GAP SERPL CALC-SCNC: 14 MMOL/L — SIGNIFICANT CHANGE UP (ref 5–17)
ANISOCYTOSIS BLD QL: SLIGHT — SIGNIFICANT CHANGE UP
AST SERPL-CCNC: 19 U/L — SIGNIFICANT CHANGE UP (ref 10–40)
BASOPHILS # BLD AUTO: 0 K/UL — SIGNIFICANT CHANGE UP (ref 0–0.2)
BASOPHILS NFR BLD AUTO: 0 % — SIGNIFICANT CHANGE UP (ref 0–2)
BILIRUB SERPL-MCNC: 0.6 MG/DL — SIGNIFICANT CHANGE UP (ref 0.2–1.2)
BUN SERPL-MCNC: 9 MG/DL — SIGNIFICANT CHANGE UP (ref 7–23)
BURR CELLS BLD QL SMEAR: PRESENT — SIGNIFICANT CHANGE UP
CALCIUM SERPL-MCNC: 9.6 MG/DL — SIGNIFICANT CHANGE UP (ref 8.4–10.5)
CHLORIDE SERPL-SCNC: 90 MMOL/L — LOW (ref 96–108)
CO2 SERPL-SCNC: 20 MMOL/L — LOW (ref 22–31)
CREAT SERPL-MCNC: 0.73 MG/DL — SIGNIFICANT CHANGE UP (ref 0.5–1.3)
DACRYOCYTES BLD QL SMEAR: SLIGHT — SIGNIFICANT CHANGE UP
ELLIPTOCYTES BLD QL SMEAR: SLIGHT — SIGNIFICANT CHANGE UP
EOSINOPHIL # BLD AUTO: 0.25 K/UL — SIGNIFICANT CHANGE UP (ref 0–0.5)
EOSINOPHIL NFR BLD AUTO: 2.6 % — SIGNIFICANT CHANGE UP (ref 0–6)
GLUCOSE SERPL-MCNC: 135 MG/DL — HIGH (ref 70–99)
HCT VFR BLD CALC: 31.4 % — LOW (ref 34.5–45)
HGB BLD-MCNC: 9.4 G/DL — LOW (ref 11.5–15.5)
LYMPHOCYTES # BLD AUTO: 0.91 K/UL — LOW (ref 1–3.3)
LYMPHOCYTES # BLD AUTO: 9.5 % — LOW (ref 13–44)
MANUAL SMEAR VERIFICATION: SIGNIFICANT CHANGE UP
MCHC RBC-ENTMCNC: 21.7 PG — LOW (ref 27–34)
MCHC RBC-ENTMCNC: 29.9 GM/DL — LOW (ref 32–36)
MCV RBC AUTO: 72.5 FL — LOW (ref 80–100)
MICROCYTES BLD QL: SLIGHT — SIGNIFICANT CHANGE UP
MONOCYTES # BLD AUTO: 0.58 K/UL — SIGNIFICANT CHANGE UP (ref 0–0.9)
MONOCYTES NFR BLD AUTO: 6.1 % — SIGNIFICANT CHANGE UP (ref 2–14)
NEUTROPHILS # BLD AUTO: 7.84 K/UL — HIGH (ref 1.8–7.4)
NEUTROPHILS NFR BLD AUTO: 80.9 % — HIGH (ref 43–77)
NEUTS BAND # BLD: 0.9 % — SIGNIFICANT CHANGE UP (ref 0–8)
NT-PROBNP SERPL-SCNC: 227 PG/ML — SIGNIFICANT CHANGE UP (ref 0–300)
PLAT MORPH BLD: NORMAL — SIGNIFICANT CHANGE UP
PLATELET # BLD AUTO: 485 K/UL — HIGH (ref 150–400)
POIKILOCYTOSIS BLD QL AUTO: SIGNIFICANT CHANGE UP
POTASSIUM SERPL-MCNC: 4.8 MMOL/L — SIGNIFICANT CHANGE UP (ref 3.5–5.3)
POTASSIUM SERPL-SCNC: 4.8 MMOL/L — SIGNIFICANT CHANGE UP (ref 3.5–5.3)
PROT SERPL-MCNC: 7.9 G/DL — SIGNIFICANT CHANGE UP (ref 6–8.3)
RBC # BLD: 4.33 M/UL — SIGNIFICANT CHANGE UP (ref 3.8–5.2)
RBC # FLD: 17.1 % — HIGH (ref 10.3–14.5)
RBC BLD AUTO: ABNORMAL
SARS-COV-2 RNA SPEC QL NAA+PROBE: SIGNIFICANT CHANGE UP
SCHISTOCYTES BLD QL AUTO: SLIGHT — SIGNIFICANT CHANGE UP
SODIUM SERPL-SCNC: 124 MMOL/L — LOW (ref 135–145)
TROPONIN T, HIGH SENSITIVITY RESULT: 7 NG/L — SIGNIFICANT CHANGE UP (ref 0–51)
TROPONIN T, HIGH SENSITIVITY RESULT: 7 NG/L — SIGNIFICANT CHANGE UP (ref 0–51)
WBC # BLD: 9.59 K/UL — SIGNIFICANT CHANGE UP (ref 3.8–10.5)
WBC # FLD AUTO: 9.59 K/UL — SIGNIFICANT CHANGE UP (ref 3.8–10.5)

## 2021-10-05 PROCEDURE — 93010 ELECTROCARDIOGRAM REPORT: CPT

## 2021-10-05 PROCEDURE — 99220: CPT

## 2021-10-05 PROCEDURE — 71046 X-RAY EXAM CHEST 2 VIEWS: CPT | Mod: 26

## 2021-10-05 RX ORDER — DEXTROSE 50 % IN WATER 50 %
25 SYRINGE (ML) INTRAVENOUS ONCE
Refills: 0 | Status: DISCONTINUED | OUTPATIENT
Start: 2021-10-05 | End: 2021-10-09

## 2021-10-05 RX ORDER — DEXTROSE 50 % IN WATER 50 %
12.5 SYRINGE (ML) INTRAVENOUS ONCE
Refills: 0 | Status: DISCONTINUED | OUTPATIENT
Start: 2021-10-05 | End: 2021-10-09

## 2021-10-05 RX ORDER — ASPIRIN/CALCIUM CARB/MAGNESIUM 324 MG
324 TABLET ORAL ONCE
Refills: 0 | Status: COMPLETED | OUTPATIENT
Start: 2021-10-05 | End: 2021-10-05

## 2021-10-05 RX ORDER — ASPIRIN/CALCIUM CARB/MAGNESIUM 324 MG
81 TABLET ORAL DAILY
Refills: 0 | Status: DISCONTINUED | OUTPATIENT
Start: 2021-10-05 | End: 2021-10-09

## 2021-10-05 RX ORDER — LOSARTAN POTASSIUM 100 MG/1
100 TABLET, FILM COATED ORAL DAILY
Refills: 0 | Status: DISCONTINUED | OUTPATIENT
Start: 2021-10-05 | End: 2021-10-09

## 2021-10-05 RX ORDER — GLUCAGON INJECTION, SOLUTION 0.5 MG/.1ML
1 INJECTION, SOLUTION SUBCUTANEOUS ONCE
Refills: 0 | Status: DISCONTINUED | OUTPATIENT
Start: 2021-10-05 | End: 2021-10-09

## 2021-10-05 RX ORDER — SODIUM CHLORIDE 9 MG/ML
1000 INJECTION, SOLUTION INTRAVENOUS
Refills: 0 | Status: DISCONTINUED | OUTPATIENT
Start: 2021-10-05 | End: 2021-10-09

## 2021-10-05 RX ORDER — DEXTROSE 50 % IN WATER 50 %
15 SYRINGE (ML) INTRAVENOUS ONCE
Refills: 0 | Status: DISCONTINUED | OUTPATIENT
Start: 2021-10-05 | End: 2021-10-09

## 2021-10-05 RX ORDER — SIMVASTATIN 20 MG/1
5 TABLET, FILM COATED ORAL DAILY
Refills: 0 | Status: DISCONTINUED | OUTPATIENT
Start: 2021-10-05 | End: 2021-10-09

## 2021-10-05 RX ORDER — LEVOTHYROXINE SODIUM 125 MCG
100 TABLET ORAL DAILY
Refills: 0 | Status: DISCONTINUED | OUTPATIENT
Start: 2021-10-05 | End: 2021-10-09

## 2021-10-05 RX ORDER — PANTOPRAZOLE SODIUM 20 MG/1
40 TABLET, DELAYED RELEASE ORAL
Refills: 0 | Status: DISCONTINUED | OUTPATIENT
Start: 2021-10-05 | End: 2021-10-09

## 2021-10-05 RX ORDER — INSULIN LISPRO 100/ML
VIAL (ML) SUBCUTANEOUS
Refills: 0 | Status: DISCONTINUED | OUTPATIENT
Start: 2021-10-05 | End: 2021-10-09

## 2021-10-05 RX ORDER — INSULIN LISPRO 100/ML
VIAL (ML) SUBCUTANEOUS AT BEDTIME
Refills: 0 | Status: DISCONTINUED | OUTPATIENT
Start: 2021-10-05 | End: 2021-10-09

## 2021-10-05 RX ORDER — LATANOPROST 0.05 MG/ML
1 SOLUTION/ DROPS OPHTHALMIC; TOPICAL DAILY
Refills: 0 | Status: DISCONTINUED | OUTPATIENT
Start: 2021-10-05 | End: 2021-10-09

## 2021-10-05 RX ADMIN — Medication 324 MILLIGRAM(S): at 19:58

## 2021-10-05 NOTE — ED PROVIDER NOTE - OBJECTIVE STATEMENT
75YO F hx of HTN, p/w CP. x3d, substernal radiating to b/l shoulders. pain constant but intermittently worse, no change on exertion, occurs at rest. no hx of dvt/pe, no recent travel, no cardiac hx, normal stress and echo 4y prior. denies fevers, sob, b/l LE pain/edema.

## 2021-10-05 NOTE — ED PROVIDER NOTE - NS ED ROS FT
Gen: Denies fevers  CV: + chest pain  Resp: Denies SOB, cough  GI: Denies nausea, vomiting  : Denies dysuria  Neuro: Denies LOC

## 2021-10-05 NOTE — ED ADULT NURSE NOTE - NSIMPLEMENTINTERV_GEN_ALL_ED
Implemented All Fall Risk Interventions:  [place] to call system. Call bell, personal items and telephone within reach. Instruct patient to call for assistance. Room bathroom lighting operational. Non-slip footwear when patient is off stretcher. Physically safe environment: no spills, clutter or unnecessary equipment. Stretcher in lowest position, wheels locked, appropriate side rails in place. Provide visual cue, wrist band, yellow gown, etc. Monitor gait and stability. Monitor for mental status changes and reorient to person, place, and time. Review medications for side effects contributing to fall risk. Reinforce activity limits and safety measures with patient and family.

## 2021-10-05 NOTE — ED CDU PROVIDER INITIAL DAY NOTE - MEDICAL DECISION MAKING DETAILS
77YO F hx of HTN, p/w CP. x3d, substernal radiating to b/l shoulders. pain constant but intermittently worse, no change on exertion, occurs at rest. no hx of dvt/pe, no recent travel, no cardiac hx, normal stress and echo 4y prior. denies fevers, sob, b/l LE pain/edema.  In ED, patient had ekg no signs of acute ischemia, troponin 7--7, chest x ray no signs of acute pathology. Laboratory significant for anemia, H/H 9.4/31.4 and Hyponatremia (serum Na 124). Pt sent to CDU for frequent reeval, vitals q 4hrs, telemetry monitoring, repeat labs and stress/echo.

## 2021-10-05 NOTE — ED CDU PROVIDER DISPOSITION NOTE - CLINICAL COURSE
77YO F hx of HTN, p/w CP. x3d, substernal radiating to b/l shoulders. pain constant but intermittently worse, no change on exertion, occurs at rest. no hx of dvt/pe, no recent travel, no cardiac hx, normal stress and echo 4y prior. denies fevers, sob, b/l LE pain/edema.  In ED, patient had ekg no signs of acute ischemia, troponin 7--7, chest x ray no signs of acute pathology. Laboratory significant for anemia, H/H 9.4/31.4 and Hyponatremia (serum Na 124). Pt sent to CDU for frequent reeval, vitals q 4hrs, telemetry monitoring, repeat labs and stress/echo. 77YO F hx of HTN, p/w CP. x3d, substernal radiating to b/l shoulders. pain constant but intermittently worse, no change on exertion, occurs at rest. no hx of dvt/pe, no recent travel, no cardiac hx, normal stress and echo 4y prior. denies fevers, sob, b/l LE pain/edema.  In ED, patient had ekg no signs of acute ischemia, troponin 7--7, chest x ray no signs of acute pathology. Laboratory significant for anemia, H/H 9.4/31.4 and Hyponatremia (serum Na 124). Pt sent to CDU for frequent reeval, vitals q 4hrs, telemetry monitoring, repeat labs and stress/echo.  In CDU, Na mildly improved. Pt went for CTC with no obstructive CAD however with abnl lung findings. TTE nonactionable. Pt seen by cardiology. Pt to continue home meds and f/u outpatient with PCP and cardiologist.

## 2021-10-05 NOTE — ED PROVIDER NOTE - CLINICAL SUMMARY MEDICAL DECISION MAKING FREE TEXT BOX
Casie Ashley MD, PGY-2: 75YO F hx of HTN, p/w CP. x3d, substernal radiating to b/l shoulders. constant but intermittently worse. VSS, PE no abnormalities. given age, gender, risk factors, plan for basic cardiac w/u and cdu for stress/echo.

## 2021-10-05 NOTE — ED ADULT TRIAGE NOTE - NSWEIGHTCALCTOOLDRUG_GEN_A_CORE
[facility]  ED Nurse Handoff Report    John Batista is a 76 year old male   ED Chief complaint: Flank Pain  . ED Diagnosis:   Final diagnoses:   Urinary tract infection   Hematuria   Urinary retention     Allergies: No Known Allergies    Code Status: Full Code  Activity level - Baseline/Home:  Assist X 1. Activity Level - Current:   Assist X 1. Lift room needed: No. Bariatric: No   Needed: No   Isolation: Yes. Infection:   C-Diff (Clostridium Difficile) diagnosis.     Vital Signs:   Vitals:    12/08/19 1900 12/08/19 2000 12/08/19 2100 12/08/19 2200   BP: (!) 157/86 137/74 (!) 146/81 (!) 149/100   Pulse: 107 99 101 103   Resp:       Temp:       TempSrc:       SpO2: 97% 96% 96% 98%       Cardiac Rhythm:  ,      Pain level: 0-10 Pain Scale: 6  Patient confused: No. Patient Falls Risk: Yes.   Elimination Status: Urethral catheter (medeiros) in place; refer to orders to discontinue as per protocol    Patient Report - Initial Complaint: Flank pain. Focused Assessment: Flank pain, urinary difficulty. Blood in urine.   Tests Performed: Labs, Imaging. Abnormal Results:   Labs Ordered and Resulted from Time of ED Arrival Up to the Time of Departure from the ED   CBC WITH PLATELETS DIFFERENTIAL - Abnormal; Notable for the following components:       Result Value    RBC Count 2.78 (*)     Hemoglobin 9.3 (*)     Hematocrit 27.6 (*)     MCH 33.5 (*)     Absolute Lymphocytes 0.3 (*)     Absolute Metamyelocytes 0.6 (*)     All other components within normal limits   COMPREHENSIVE METABOLIC PANEL - Abnormal; Notable for the following components:    Glucose 122 (*)     Alkaline Phosphatase 176 (*)     All other components within normal limits   LIPASE - Abnormal; Notable for the following components:    Lipase 69 (*)     All other components within normal limits   ROUTINE UA WITH MICROSCOPIC - Abnormal; Notable for the following components:    Blood Urine Large (*)     Protein Albumin Urine 100 (*)      Leukocyte Esterase Urine Moderate (*)     WBC Urine 102 (*)     RBC Urine >182 (*)     Mucous Urine Present (*)     All other components within normal limits   PERIPHERAL IV CATHETER   URINE CULTURE AEROBIC BACTERIAL     Abd/pelvis CT no contrast - Stone Protocol   Final Result   IMPRESSION:    1.  Moderate bilateral hydroureteronephrosis. Degree of necrosis is increased compared to the previous study. No visible ureteral calculi.   2.  The bladder is decompressed with a Rondon catheter but the wall is diffusely thickened. Correlate for cystitis.   3.  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   4.  Diverticulosis.   5.  Basilar pulmonary nodules some of which have decreased in size.           .   Treatments provided: Rondon, See MAR  Family Comments:   OBS brochure/video discussed/provided to patient:  yes  ED Medications:   Medications   HYDROmorphone (PF) (DILAUDID) injection 0.5 mg (0.5 mg Intravenous Given 12/8/19 2312)   0.9% sodium chloride BOLUS (0 mLs Intravenous Stopped 12/8/19 1942)   cefTRIAXone (ROCEPHIN) 1 g vial to attach to  mL bag for ADULTS or NS 50 mL bag for PEDS (0 g Intravenous Stopped 12/8/19 2202)   HYDROmorphone (PF) (DILAUDID) injection 0.5 mg (0.5 mg Intravenous Given 12/8/19 2203)   LORazepam (ATIVAN) injection 0.5 mg (0.5 mg Intravenous Given 12/8/19 2249)     Drips infusing:  No  For the majority of the shift, the patient's behavior Green. Interventions performed were NA.     Severe Sepsis OR Septic Shock Diagnosis Present: No      ED Nurse Name/Phone Number: Salima Aguilar RN,   12:17 AM    RECEIVING UNIT ED HANDOFF REVIEW    Above ED Nurse Handoff Report was reviewed: Yes  Reviewed by: Sailaja Tobin RN on December 9, 2019 at 12:42 AM     used

## 2021-10-05 NOTE — ED CDU PROVIDER DISPOSITION NOTE - CARE PROVIDER_API CALL
Karsten Estrella)  Cardiovascular Disease  [address]  [place] [address]  Phone: [phone]  Fax: [phone]  Follow Up Time:

## 2021-10-05 NOTE — ED PROVIDER NOTE - ATTENDING CONTRIBUTION TO CARE
Agree with above except noted:    intermittent chest pain for 3 days but here mainly for lightheadedness and dizziness that worsening for today. patient reports her BP has been elevated to 200s so she has been drinking more water for the last few days. no chest pain or shortness of breath or LE swelling or edema, fever or cough. concerning for ACS and hyponatremia. will get comprehensive labs to evaluate for hematologic abnormality, renal function, electrolyte derangement for possible symptom etiology. trop, ekg. cxr. dispo pending labs/imaging/further workups.

## 2021-10-05 NOTE — ED CDU PROVIDER DISPOSITION NOTE - PATIENT PORTAL LINK FT
You can access the FollowMyHealth Patient Portal offered by [facility] by registering at the following website: [email or web address]. By joining [facility]’s FollowMyHealth portal, you will also be able to view your health information using other applications (apps) compatible with our system.

## 2021-10-05 NOTE — ED CDU PROVIDER INITIAL DAY NOTE - DETAILS
CHEST PAIN  -[facility]  -LISSETTEMI  -HAMMAD EVAL  -TTE/STRESS TEST  -CASE D/W ATTENDING

## 2021-10-05 NOTE — ED PROVIDER NOTE - PROGRESS NOTE DETAILS
Casie Ashley MD, PGY-2: spoke with CDU PAkatherine pt O'Radha DO PGY-2: Plan for CDU. Pt started two new meds coreg and losartan. Pt also states she has been drinking lots of water.   Current perscription med list include: amlodipine, aspirin, atenolol, glimepiridine, levothyroxine, meloxicam, metformin, pantoprazole, pazeo, zocor, travatan Z.

## 2021-10-05 NOTE — ED CDU PROVIDER DISPOSITION NOTE - NSFOLLOWUPINSTRUCTIONS_ED_ALL_ED_FT
1. Follow up with your PMD within 48-72 hours.   You may schedule appointment with Cardiology clinic this week by calling [phone]  2. Show copies of your reports given to you. Take all of your other medications as previously prescribed.   3. Worsening or continued chest pain, shortness of breath, weakness, return to ED. Continue your home medications as previously prescribed.     Please follow up with your primary care provider upon discharge. Be sure to follow up for repeat labs (you had low sodium in the ER) and repeat CT Chest in 3 months as discussed.     Please also follow up with cardiologist upon discharge. Information provided.     Return to ER for any worsening or continued chest pain, shortness of breath, weakness, or any other concerning symptoms.        Karsten Estrella)  Cardiovascular Disease  [address]  [place] [address]  Phone: [phone]

## 2021-10-05 NOTE — ED CDU PROVIDER INITIAL DAY NOTE - OBJECTIVE STATEMENT
75YO F hx of HTN, p/w CP. x3d, substernal radiating to b/l shoulders. pain constant but intermittently worse, no change on exertion, occurs at rest. no hx of dvt/pe, no recent travel, no cardiac hx, normal stress and echo 4y prior. denies fevers, sob, b/l LE pain/edema.  In ED, patient had ekg no signs of acute ischemia, troponin 7--7, chest x ray no signs of acute pathology. Laboratory significant for anemia, H/H 9.4/31.4 and Hyponatremia (serum Na 124). Pt sent to CDU for frequent reeval, vitals q 4hrs, telemetry monitoring, repeat labs and stress/echo.

## 2021-10-05 NOTE — ED ADULT NURSE NOTE - OBJECTIVE STATEMENT
76yF presents to the ED from home with complaints of elevated BP, dizziness/lightheadedness and HA. PMHx of HTN. Pt reports [id number] ago her BP medication changed from amlodipine and atenolol once a day to losartan 100mg once a day. Pt informed her MD of continued high BP with episodes of lightheadedness/dizziness and her PMD added cardvedilol 6.25mg twice a day to her losartan. Pt picked up her prescription this morning and took her first dose at ~11:30am. Pt came to [facility] ED today for continued high blood pressure at home with episode of R sided HA, mild increased blurred vision, nausea (no vomiting), and anterior wall chest discomfort. Upon arrival to ED, pt reports resolved CP, blurred vision and nausea but continues to report 6/10 R sided HA and lightheadedness/dizziness. Pt AAOx4, VS as documented, cardiac monitor in place. Pt gross neuro intact. Pt lungs clear BL. Pt has no increased WOB, labored respirations, or retractions. Pt abdomen soft and nontender to palpation. Pt has + pulses in UE and LE BL. Pt denies SOB, abdominal pain, back pain, fever/chills, urinary symptoms, hematuria, numbness, tinging, loss of taste, loss of smell. Pt placed in position of comfort. Pt educated on call bell system and provided call bell. Bed in lowest position, wheels locked, appropriate side rails raised. Pt denies needs at this time. Pt aware of plan to await lab results.

## 2021-10-05 NOTE — ED PROVIDER NOTE - PHYSICAL EXAMINATION
Gen: WDWN, NAD  HEENT: EOMI, no nasal discharge, mucous membranes moist  CV: regular rate and rhythm, 2+ radial pulses b/l  Resp: CTAB, no W/R/R, no accessory muscle use, no increased work of breathing  GI: Abdomen soft non-distended, NTTP  MSK: No open wounds, no bruising, no LE edema  Neuro: A&Ox4, following commands, moving all four extremities spontaneously  Psych: appropriate mood

## 2021-10-06 VITALS
RESPIRATION RATE: 18 BRPM | SYSTOLIC BLOOD PRESSURE: 159 MMHG | OXYGEN SATURATION: 99 % | TEMPERATURE: 98 F | DIASTOLIC BLOOD PRESSURE: 68 MMHG | HEART RATE: 76 BPM

## 2021-10-06 LAB
A1C WITH ESTIMATED AVERAGE GLUCOSE RESULT: 7.6 % — HIGH (ref 4–5.6)
ANION GAP SERPL CALC-SCNC: 14 MMOL/L — SIGNIFICANT CHANGE UP (ref 5–17)
BUN SERPL-MCNC: 9 MG/DL — SIGNIFICANT CHANGE UP (ref 7–23)
CALCIUM SERPL-MCNC: 9.1 MG/DL — SIGNIFICANT CHANGE UP (ref 8.4–10.5)
CHLORIDE SERPL-SCNC: 95 MMOL/L — LOW (ref 96–108)
CHOLEST SERPL-MCNC: 90 MG/DL — SIGNIFICANT CHANGE UP
CO2 SERPL-SCNC: 19 MMOL/L — LOW (ref 22–31)
CREAT SERPL-MCNC: 0.74 MG/DL — SIGNIFICANT CHANGE UP (ref 0.5–1.3)
ESTIMATED AVERAGE GLUCOSE: 171 MG/DL — HIGH (ref 68–114)
GLUCOSE BLDC GLUCOMTR-MCNC: 174 MG/DL — HIGH (ref 70–99)
GLUCOSE BLDC GLUCOMTR-MCNC: 180 MG/DL — HIGH (ref 70–99)
GLUCOSE BLDC GLUCOMTR-MCNC: 219 MG/DL — HIGH (ref 70–99)
GLUCOSE SERPL-MCNC: 172 MG/DL — HIGH (ref 70–99)
HDLC SERPL-MCNC: 30 MG/DL — LOW
LIPID PNL WITH DIRECT LDL SERPL: 44 MG/DL — SIGNIFICANT CHANGE UP
NON HDL CHOLESTEROL: 61 MG/DL — SIGNIFICANT CHANGE UP
POTASSIUM SERPL-MCNC: 4 MMOL/L — SIGNIFICANT CHANGE UP (ref 3.5–5.3)
POTASSIUM SERPL-SCNC: 4 MMOL/L — SIGNIFICANT CHANGE UP (ref 3.5–5.3)
SODIUM SERPL-SCNC: 128 MMOL/L — LOW (ref 135–145)
TRIGL SERPL-MCNC: 83 MG/DL — SIGNIFICANT CHANGE UP

## 2021-10-06 PROCEDURE — 99217: CPT

## 2021-10-06 PROCEDURE — 93306 TTE W/DOPPLER COMPLETE: CPT | Mod: 26

## 2021-10-06 PROCEDURE — 75574 CT ANGIO HRT W/3D IMAGE: CPT | Mod: 26,MA

## 2021-10-06 PROCEDURE — 71250 CT THORAX DX C-: CPT | Mod: 26,59,MD

## 2021-10-06 RX ORDER — METOPROLOL TARTRATE 50 MG
100 TABLET ORAL ONCE
Refills: 0 | Status: COMPLETED | OUTPATIENT
Start: 2021-10-06 | End: 2021-10-06

## 2021-10-06 RX ADMIN — Medication 100 MILLIGRAM(S): at 10:00

## 2021-10-06 RX ADMIN — PANTOPRAZOLE SODIUM 40 MILLIGRAM(S): 20 TABLET, DELAYED RELEASE ORAL at 09:07

## 2021-10-06 RX ADMIN — Medication 2: at 13:34

## 2021-10-06 RX ADMIN — Medication 100 MICROGRAM(S): at 06:23

## 2021-10-06 RX ADMIN — Medication 81 MILLIGRAM(S): at 09:26

## 2021-10-06 RX ADMIN — LATANOPROST 1 DROP(S): 0.05 SOLUTION/ DROPS OPHTHALMIC; TOPICAL at 09:27

## 2021-10-06 NOTE — ED CDU PROVIDER SUBSEQUENT DAY NOTE - PROGRESS NOTE DETAILS
Patient seen and evaluated at bedside, resting comfortably, in NAD. No CP, no SOB. Most recent VSS. No events on telemetry monitor. Called CDU cards attending of day Dr. Maya for eval, requesting Dr. Persaud be called prior in case of cardiology preference. Left VM for Dr. Persaud, awaiting call back. Dr. Persaud advising to call Dr. Estrella. Paging Dr. Estrella now. Pt seen by cardiology team, pending echo results. D/w Dr. Garibay, CT chest ordered as recommended by radiology on CTC report. TTE nonactionable. All results d/w patient. No fever/chills, no cough, no productive sputum. Pt understands to f/u for repeat CT chest in 3 months. Stable for d/c. D/w Dr. Padron (Blue evening Attg). ECHO unremarkable - pt is stable for d/c and will f/u with his PMD and cardiology.

## 2021-10-06 NOTE — ED CDU PROVIDER SUBSEQUENT DAY NOTE - HISTORY
CDU PROGRESS NOTE PA SRINIVAS: Pt resting comfortably, feeling well without complaint. NAD, VSS. No events on telemetry.

## 2021-10-06 NOTE — ED ADULT NURSE REASSESSMENT NOTE - NS ED NURSE REASSESS COMMENT FT1
07.00 Am Received the Pt from  VITALY Ray . Pt is Observed for Chest pain for CTC& echo . Received the Pt A&OX 4 obeys commands Lesly N/V/D fever chills cp SOB   Comfort care & safety measures continued  IV site looks clean & dry no signs of infiltration noted pt denies  pain IV site .  Pt is advised to call for help  call bell with in the reach pt verbalized the understanding .   pending CDU  MD kong . GCS 15/15 A&OX 4 PERRLA  size 3 Strong upper & lower extremities steady gait   Pt is ambulatory & independent  No facial droop  No Hand Leg drop denies numbness tingling Continue to monitor. Pt is NSR on monitor
Pt discharged as per provider. teaching provided by UBALDO Del Angel Pt verbalizes understanding to discharge orders & will follow up with PMD post discharge. IV lock removed. No bleeding noted. Pt stable upon discharge.
Pt received from VITALY Bernard. Pt oriented to CDU & plan of care was discussed. Pt A&O x 4. Pt in CDU for frequent reeval, vitals q 4hrs, telemetry monitoring, repeat labs and stress/echo. Pt denies any chest pain, SOB, dizziness or palpitations as of now. Pt on a cardiac monitor in NSR, HR in 70's. V/S stable, pt afebrile,  IV in place, patent and free of signs of infiltration. Pt resting in bed. Safety & comfort measures maintained. Call bell in reach. Will continue to monitor.

## 2021-10-06 NOTE — CONSULT NOTE ADULT - ATTENDING COMMENTS
seen and agree w/ PA.    reassuring cCTA and Echo, likely noncardiac chest pain.  followup w/ Dr Persaud.

## 2021-10-06 NOTE — CONSULT NOTE ADULT - SUBJECTIVE AND OBJECTIVE BOX
C A R D I O L O G Y  *********************    DATE OF SERVICE: 10-06-21    HISTORY OF PRESENT ILLNESS: HPI:  Patient is a 77 y/o Female with PMH of HTN, DM2, and hypothyroidism who presented with chest pain. Cardiology consulted for further evaluation. Chest pain currently resolved. Patient reports chest pain for 3 days, substernal radiating to b/l shoulders. pain constant but intermittently worse, no change on exertion, occurs at rest. no hx of dvt/pe, no recent travel, no cardiac hx, normal stress and echo 4y prior. Denies SOB, palpitations, dizziness, or syncope.    PAST MEDICAL & SURGICAL HISTORY:  Hypertension    Diabetes mellitus    Hypothyroidism    No significant past surgical history      MEDICATIONS:  MEDICATIONS  (STANDING):  aspirin enteric coated 81 milliGRAM(s) Oral daily  dextrose 40% Gel 15 Gram(s) Oral once  dextrose 5%. 1000 milliLiter(s) (50 mL/Hr) IV Continuous <Continuous>  dextrose 5%. 1000 milliLiter(s) (100 mL/Hr) IV Continuous <Continuous>  dextrose 50% Injectable 25 Gram(s) IV Push once  dextrose 50% Injectable 12.5 Gram(s) IV Push once  dextrose 50% Injectable 25 Gram(s) IV Push once  glucagon  Injectable 1 milliGRAM(s) IntraMuscular once  insulin lispro (ADMELOG) corrective regimen sliding scale   SubCutaneous three times a day before meals  insulin lispro (ADMELOG) corrective regimen sliding scale   SubCutaneous at bedtime  latanoprost 0.005% Ophthalmic Solution 1 Drop(s) Both EYES daily  levothyroxine 100 MICROGram(s) Oral daily  losartan 100 milliGRAM(s) Oral daily  pantoprazole    Tablet 40 milliGRAM(s) Oral before breakfast  simvastatin 5 milliGRAM(s) Oral daily      Allergies    eggs (Stomach Upset)  No Known Drug Allergies    Intolerances        FAMILY HISTORY:  No pertinent family history in first degree relatives      Non-contributary for premature coronary disease or sudden cardiac death    SOCIAL HISTORY:    [x ] Non-smoker  [ ] Smoker  [ ] Alcohol    FLU VACCINE THIS YEAR STARTS IN AUGUST:  [ ] Yes    [ ] No    IF OVER 65 HAVE YOU EVER HAD A PNA VACCINE:  [ ] Yes    [ ] No       [ ] N/A      REVIEW OF SYSTEMS:  [x ]chest pain  [  ]shortness of breath  [  ]palpitations  [  ]syncope  [ ]near syncope [ ]upper extremity weakness   [ ] lower extremity weakness  [  ]diplopia  [  ]altered mental status   [  ]fevers  [ ]chills [ ]nausea  [ ]vomitting  [  ]dysphagia    [ ]abdominal pain  [ ]melena  [ ]BRBPR    [  ]epistaxis  [  ]rash    [ ]lower extremity edema        [X] All others negative	  [ ] Unable to obtain      LABS:	 	    CARDIAC MARKERS:                              9.4    9.59  )-----------( 485      ( 05 Oct 2021 17:16 )             31.4     Hb Trend: 9.4<--    10-06    128<L>  |  95<L>  |  9   ----------------------------<  172<H>  4.0   |  19<L>  |  0.74    Ca    9.1      06 Oct 2021 06:48    TPro  7.9  /  Alb  4.3  /  TBili  0.6  /  DBili  x   /  AST  19  /  ALT  18  /  AlkPhos  90  10-05    Creatinine Trend: 0.74<--, 0.73<--    Coags:      proBNP: Serum Pro-Brain Natriuretic Peptide: 227 pg/mL (10-05 @ 17:16)    Lipid Profile:   HgA1c:   TSH:         PHYSICAL EXAM:  T(C): 36.8 (10-06-21 @ 12:31), Max: 37.1 (10-05-21 @ 20:01)  HR: 64 (10-06-21 @ 12:31) (64 - 78)  BP: 120/49 (10-06-21 @ 12:31) (120/49 - 184/68)  RR: 18 (10-06-21 @ 12:31) (18 - 20)  SpO2: 99% (10-06-21 @ 12:31) (99% - 100%)  Wt(kg): --   BMI (kg/m2): 23.8 (10-05-21 @ 15:12)  I&O's Summary      Gen: Appears well in NAD  HEENT:  (-)icterus (-)pallor  CV: N S1 S2 1/6 MELINA (+)2 Pulses B/l  Resp:  Clear to auscultation B/L, normal effort  GI: (+) BS Soft, NT, ND  Lymph:  (-)Edema, (-)obvious lymphadenopathy  Skin: Warm to touch, Normal turgor  Psych: Appropriate mood and affect      TELEMETRY: 	      ECG: NSR, no acute ST-T changes     Echo: Result Pending    < from: CT Angio Heart and Coronaries w/ IV Cont (10.06.21 @ 12:58) >  IMPRESSION:    1. Agatston Calcium Score: 1 ; Insufficient demographic data to assess ADKINS percentile.    2. Coronary CTA: Right dominant coronary circulation. No obstructive coronary artery disease. Minimal stenoses, less than 25%, secondary to calcified and noncalcified plaque.    3. The thorax is only partially imaged. Mild emphysema. Bilateral bronchial wall thickening and airways impaction. Focal dense consolidation of theright upper lobe and right middle lobe in perihilar location. Additional indeterminate 0.7 cm nodule of the right lower lobe. Mediastinal and hilar adenopathy of unclear etiology. Differential diagnosis includes pulmonary infection and/or neoplasm. Chest CT is recommended for definitive evaluation of the entire thorax.    4. Left atrial diverticulum along the interatrial septum. Small PFO cannot be excluded.    --- End of Report ---    < end of copied text >  	    RADIOLOGY:         CXR: Clear Lungs    ASSESSMENT/PLAN: Patient is a 77 y/o Female with PMH of HTN, DM2, and hypothyroidism who presented with chest pain. Cardiology consulted for further evaluation.     - MI ruled out  - Not in clinical HF  - CT Cors with nonobstructive CAD  - Continue ASA/Statin  - Awaiting echo results  - Patient should f/u with PCP/Pulm as outpatient regarding abnormal lung findings on CT  - No further inpatient cardiac w/u needed if echo unremarkable    Niko Amanda PA-C  Pager: [phone]

## 2021-10-06 NOTE — ED CLERICAL - NS ED CLERK UNITS
Phase III Cardiopulmonary Rehab:  Pt attended self pay maintenance exercise session. Vitals and exercise logged per pt.     
CDU1

## 2022-04-26 ENCOUNTER — APPOINTMENT (OUTPATIENT)
Dept: CARDIOLOGY | Facility: HOSPITAL | Age: 77
End: 2022-04-26

## 2022-04-26 ENCOUNTER — NON-APPOINTMENT (OUTPATIENT)
Age: 77
End: 2022-04-26

## 2022-04-26 VITALS
OXYGEN SATURATION: 99 % | SYSTOLIC BLOOD PRESSURE: 214 MMHG | RESPIRATION RATE: 16 BRPM | DIASTOLIC BLOOD PRESSURE: 80 MMHG | TEMPERATURE: 98.1 F | HEART RATE: 83 BPM | WEIGHT: 133 LBS

## 2022-04-26 VITALS — SYSTOLIC BLOOD PRESSURE: 205 MMHG | DIASTOLIC BLOOD PRESSURE: 78 MMHG

## 2022-04-26 VITALS — DIASTOLIC BLOOD PRESSURE: 68 MMHG | SYSTOLIC BLOOD PRESSURE: 190 MMHG

## 2022-04-26 DIAGNOSIS — E03.9 HYPOTHYROIDISM, UNSPECIFIED: ICD-10-CM

## 2022-04-26 DIAGNOSIS — Z78.9 OTHER SPECIFIED HEALTH STATUS: ICD-10-CM

## 2022-04-26 DIAGNOSIS — H40.9 UNSPECIFIED GLAUCOMA: ICD-10-CM

## 2022-04-26 DIAGNOSIS — Z82.49 FAMILY HISTORY OF ISCHEMIC HEART DISEASE AND OTHER DISEASES OF THE CIRCULATORY SYSTEM: ICD-10-CM

## 2022-04-26 RX ORDER — LEVOTHYROXINE SODIUM 0.1 MG/1
100 TABLET ORAL DAILY
Refills: 0 | Status: ACTIVE | COMMUNITY

## 2022-04-26 RX ORDER — GLIMEPIRIDE 2 MG/1
2 TABLET ORAL DAILY
Refills: 0 | Status: ACTIVE | COMMUNITY

## 2022-04-26 RX ORDER — CLONIDINE HYDROCHLORIDE 0.1 MG/1
0.1 TABLET ORAL
Refills: 0 | Status: DISCONTINUED | COMMUNITY
End: 2022-04-26

## 2022-04-26 RX ORDER — ASPIRIN ENTERIC COATED TABLETS 81 MG 81 MG/1
81 TABLET, DELAYED RELEASE ORAL DAILY
Refills: 0 | Status: ACTIVE | COMMUNITY

## 2022-04-26 RX ORDER — METFORMIN HYDROCHLORIDE 850 MG/1
850 TABLET, COATED ORAL TWICE DAILY
Refills: 0 | Status: ACTIVE | COMMUNITY

## 2022-04-26 RX ORDER — PANTOPRAZOLE 40 MG/1
40 TABLET, DELAYED RELEASE ORAL DAILY
Refills: 0 | Status: ACTIVE | COMMUNITY

## 2022-04-26 NOTE — ASSESSMENT
[FreeTextEntry1] : 75yo woman with PMH of HTN, HLD, DMT2, hypothyroidism, GERD, glaucoma presenting for first time cardiology clinic appointment. Patient's primary issue is poorly controlled hypertension which appears chronic. She has not yet been prescribed a diuretic. \par \par #HTN\par - Continue carvedilol 12.5 mg BID\par - Continue losartan 100 mg daily \par - Start chlorthalidone 25 mg daily \par - Discontinue clonidine for now\par \par #HLD\par - Continue simvastatin 5 mg daily \par \par #DM2 and glaucoma \par - Continue follow up with PCP\par \par RTC in 2 weeks for BP check and medication adjustment if needed\par \par Case discussed with clinic attending \par \par Tuan Pedraza MD\par Cardiology Fellow, PGY4\par

## 2022-04-26 NOTE — PHYSICAL EXAM
[Well Developed] : well developed [Well Nourished] : well nourished [No Acute Distress] : no acute distress [Normal Conjunctiva] : normal conjunctiva [Normal Venous Pressure] : normal venous pressure [No Carotid Bruit] : no carotid bruit [Normal S1, S2] : normal S1, S2 [No Murmur] : no murmur [No Rub] : no rub [No Gallop] : no gallop [Clear Lung Fields] : clear lung fields [Good Air Entry] : good air entry [No Respiratory Distress] : no respiratory distress  [Soft] : abdomen soft [Non Tender] : non-tender [No Masses/organomegaly] : no masses/organomegaly [Normal Bowel Sounds] : normal bowel sounds [Normal Gait] : normal gait [No Edema] : no edema [No Cyanosis] : no cyanosis [No Clubbing] : no clubbing [No Varicosities] : no varicosities [No Rash] : no rash [No Skin Lesions] : no skin lesions [Moves all extremities] : moves all extremities [No Focal Deficits] : no focal deficits [Normal Speech] : normal speech [Alert and Oriented] : alert and oriented [Normal memory] : normal memory [de-identified] : No carotid bruits [de-identified] : No renal artery bruits

## 2022-04-26 NOTE — HISTORY OF PRESENT ILLNESS
[FreeTextEntry1] : Patient arrives 15 minutes late for her 30 minute visit today.\par \par 77yo woman with PMH of HTN, HLD, DMT2, hypothyroidism, GERD, glaucoma presenting for first time cardiology clinic appointment. \par \par Of note patient had brief hospitalization at [facility] in 11/2018 for chest pain and had cardiac catheterization done at the time revealing normal coronaries.  \par \par Patient reports she was recently admitted to [facility] for "hypertension." She was evaluated in ED at [facility] and had CT coronaries revealing nonobstructive CAD. \par \par Today in clinic she is hypertensive to 214/80, improved to 190/68 on manual BP check. \par \par Patient reports she chronically has elevated BP and used to take amlodipine 10 mg and atenolol 50 mg and her BP used to be better controlled, but she said because of glaucoma she was switched to losartan 100 mg daily and carvedilol 12.5 mg BID (her ophtho called her PCP). Patient has been prescribed clonidine by her PCP, but she reports only using this twice and discontinuing. She denies chest pain, LE edema, orthopnea, PND, palpitations, but does endorse occasional HOUSER. \par \par ECG today within normal limits. \par \par

## 2022-05-31 ENCOUNTER — APPOINTMENT (OUTPATIENT)
Dept: CARDIOLOGY | Facility: HOSPITAL | Age: 77
End: 2022-05-31

## 2022-07-26 ENCOUNTER — APPOINTMENT (OUTPATIENT)
Dept: CARDIOLOGY | Facility: HOSPITAL | Age: 77
End: 2022-07-26

## 2022-07-26 VITALS
DIASTOLIC BLOOD PRESSURE: 68 MMHG | WEIGHT: 135 LBS | OXYGEN SATURATION: 97 % | SYSTOLIC BLOOD PRESSURE: 155 MMHG | HEART RATE: 93 BPM | RESPIRATION RATE: 16 BRPM | TEMPERATURE: 98.1 F

## 2022-07-26 RX ORDER — LOSARTAN POTASSIUM 100 MG/1
100 TABLET, FILM COATED ORAL DAILY
Refills: 0 | Status: DISCONTINUED | COMMUNITY
End: 2022-07-26

## 2022-07-26 RX ORDER — CARVEDILOL 12.5 MG/1
12.5 TABLET, FILM COATED ORAL TWICE DAILY
Refills: 0 | Status: DISCONTINUED | COMMUNITY
End: 2022-07-26

## 2022-07-26 NOTE — ASSESSMENT
[FreeTextEntry1] : 77yo woman with PMH of HTN, HLD, DMT2, hypothyroidism, GERD, glaucoma presenting for cardiology follow up appointment. Patient's primary issue is poorly controlled hypertension which appears improved with initiation of diuretic. BP remains above goal, however patient would like to trail low sodium diet in addition to current regimen before intensification of current regimen. \par \par #HTN\par - Continue carvedilol 12.5 mg BID\par - Continue losartan 100 mg daily \par - Continue chlorthalidone 25 mg daily \par \par #HLD\par - Continue simvastatin 5 mg daily \par \par #DM2 and glaucoma \par - Continue follow up with PCP\par \par #iIron def anemia\par - Continue oral iron per PCP\par \par RTC in 3 months for HTN follow up\par \par Case discussed with clinic attending \par \par Tuan Pedraza MD\par Cardiology Fellow, PGY4

## 2022-07-26 NOTE — PHYSICAL EXAM
[Well Developed] : well developed [Well Nourished] : well nourished [No Acute Distress] : no acute distress [Normal Conjunctiva] : normal conjunctiva [Normal Venous Pressure] : normal venous pressure [No Carotid Bruit] : no carotid bruit [Normal S1, S2] : normal S1, S2 [No Murmur] : no murmur [No Rub] : no rub [No Gallop] : no gallop [Clear Lung Fields] : clear lung fields [Good Air Entry] : good air entry [No Respiratory Distress] : no respiratory distress  [Soft] : abdomen soft [Non Tender] : non-tender [No Masses/organomegaly] : no masses/organomegaly [Normal Bowel Sounds] : normal bowel sounds [Normal Gait] : normal gait [No Edema] : no edema [No Cyanosis] : no cyanosis [No Clubbing] : no clubbing [No Varicosities] : no varicosities [No Rash] : no rash [No Skin Lesions] : no skin lesions [Moves all extremities] : moves all extremities [No Focal Deficits] : no focal deficits [Normal Speech] : normal speech [Alert and Oriented] : alert and oriented [Normal memory] : normal memory [de-identified] : No carotid bruits [de-identified] : No renal artery bruits

## 2022-07-26 NOTE — HISTORY OF PRESENT ILLNESS
[FreeTextEntry1] : PCP: Dr. JENNIFER Persaud ([phone])\par \par Patient arrives 15 minutes late for her 30 minute visit today. Was also late last visit on 4/26/22. \par \par 75yo woman with PMH of HTN, HLD, DMT2, hypothyroidism, GERD, glaucoma presenting for follow up for HTN.  \par \par Last visit patient was hypertensive to 190/68 on manual BP measurement. She was started on chlorthalidone 25 mg daily in addition her home carvedilol 12.5 mg BID and losartan 100 mg daily. \par \par Today patient reports she feels well on chlorthalidone. She is compliant. Checks BP at home using automatic BP cuff and reports average BP is in the "140s."\par \par BP today 155/68. Patient reports she would like to lower sodium in diet and have her BP rechecked for now rather than increasing antihypertensive medications. \par \par

## 2022-07-27 PROBLEM — E11.9 TYPE 2 DIABETES MELLITUS WITHOUT COMPLICATIONS: Chronic | Status: ACTIVE | Noted: 2021-10-05

## 2022-07-27 PROBLEM — E03.9 HYPOTHYROIDISM, UNSPECIFIED: Chronic | Status: ACTIVE | Noted: 2021-10-05

## 2022-07-27 PROBLEM — I10 ESSENTIAL (PRIMARY) HYPERTENSION: Chronic | Status: ACTIVE | Noted: 2021-10-05

## 2022-12-15 NOTE — ED CDU PROVIDER SUBSEQUENT DAY NOTE - PROGRESS NOTE ADDITIONAL2
Additional Progress Note... podiatry was at bedside to evaluate patient  and states may be discharged on oral anti-fungal.

## 2022-12-27 ENCOUNTER — APPOINTMENT (OUTPATIENT)
Dept: CARDIOLOGY | Facility: HOSPITAL | Age: 77
End: 2022-12-27

## 2022-12-27 ENCOUNTER — NON-APPOINTMENT (OUTPATIENT)
Age: 77
End: 2022-12-27

## 2022-12-27 VITALS
OXYGEN SATURATION: 99 % | BODY MASS INDEX: 25.11 KG/M2 | DIASTOLIC BLOOD PRESSURE: 80 MMHG | HEART RATE: 73 BPM | HEIGHT: 61 IN | SYSTOLIC BLOOD PRESSURE: 179 MMHG | WEIGHT: 133 LBS

## 2022-12-27 DIAGNOSIS — K59.09 OTHER CONSTIPATION: ICD-10-CM

## 2022-12-27 RX ORDER — DOCUSATE SODIUM 100 MG/1
100 CAPSULE ORAL TWICE DAILY
Qty: 60 | Refills: 0 | Status: ACTIVE | COMMUNITY
Start: 2022-12-27 | End: 1900-01-01

## 2022-12-27 NOTE — ASSESSMENT
[FreeTextEntry1] : 77yo woman with PMH of HTN, HLD, DMT2, hypothyroidism, GERD, glaucoma presenting for cardiology follow up appointment. Patient's primary issue is poorly controlled hypertension. \par \par #HTN\par - Increase carvedilol 12.5 mg BID to 25 mg BID\par - Continue losartan 100 mg daily \par - Continue chlorthalidone 25 mg daily \par - Patient instructed not to take PO salt tablets prescribed by PCP, can liberate salt intake with food\par \par #HLD\par - Continue simvastatin 5 mg daily \par \par #DM2 and glaucoma \par - Continue follow up with PCP\par \par #Iron def anemia\par - Oral iron discontinued by PCP due to constipation\par - Encouraged intake of leafy greens rich in iron\par \par #Constipation\par - Continue senna per PCP\par - Start colace 100 mg PO BID\par \par RTC in 1 month for HTN follow up\par If patient remains hypertensive at next visit will consider secondary HTN workup\par \par Case discussed with clinic attending \par \par Tuan Pedraza MD\par Cardiology Fellow, PGY5

## 2022-12-27 NOTE — PHYSICAL EXAM
[Well Developed] : well developed [Well Nourished] : well nourished [No Acute Distress] : no acute distress [Normal Conjunctiva] : normal conjunctiva [Normal Venous Pressure] : normal venous pressure [No Carotid Bruit] : no carotid bruit [Normal S1, S2] : normal S1, S2 [No Murmur] : no murmur [No Rub] : no rub [No Gallop] : no gallop [Clear Lung Fields] : clear lung fields [Good Air Entry] : good air entry [No Respiratory Distress] : no respiratory distress  [Soft] : abdomen soft [Non Tender] : non-tender [No Masses/organomegaly] : no masses/organomegaly [Normal Bowel Sounds] : normal bowel sounds [Normal Gait] : normal gait [No Edema] : no edema [No Cyanosis] : no cyanosis [No Clubbing] : no clubbing [No Varicosities] : no varicosities [No Rash] : no rash [No Skin Lesions] : no skin lesions [Moves all extremities] : moves all extremities [No Focal Deficits] : no focal deficits [Normal Speech] : normal speech [Alert and Oriented] : alert and oriented [Normal memory] : normal memory [de-identified] : No carotid bruits [de-identified] : No renal artery bruits

## 2022-12-27 NOTE — HISTORY OF PRESENT ILLNESS
[FreeTextEntry1] : PCP: Dr. JENNIFER Persaud ([phone])\par \par 77yo woman with PMH of HTN, HLD, DMT2, hypothyroidism, GERD, glaucoma presenting for follow up for HTN.  \par \par Last visit patient was hypertensive to 155/68, but elected to try to lower sodium in diet and have her BP rechecked before increasing antihypertensive medications. \par \par Today patient's BP is 179/80. She states she went to PCP and had bloodwork done demonstrating low sodium () and she was prescribed oral sodium chloride tablets. At prior visit patient stated she would try low sodium diet for hypertension. Other notable labs (TSH 1.92), HBA1c 7.1%, LDL 39. PCP also prescribed senna for constipation. \par \par

## 2023-01-24 ENCOUNTER — APPOINTMENT (OUTPATIENT)
Dept: CARDIOLOGY | Facility: HOSPITAL | Age: 78
End: 2023-01-24

## 2023-01-24 VITALS
SYSTOLIC BLOOD PRESSURE: 169 MMHG | HEIGHT: 61 IN | HEART RATE: 79 BPM | WEIGHT: 133 LBS | OXYGEN SATURATION: 99 % | DIASTOLIC BLOOD PRESSURE: 72 MMHG | BODY MASS INDEX: 25.11 KG/M2

## 2023-01-24 NOTE — HISTORY OF PRESENT ILLNESS
[FreeTextEntry1] : PCP: Dr. JENNIFER Persaud ([phone])\par \par 75yo woman with PMH of HTN, HLD, DMT2, hypothyroidism, GERD, glaucoma presenting for follow up for HTN.  \par \par Last visit patient was hypertensive to 179/80. Carvedilol uptitrated from 12.5 mg BID to 25 mg BID with plan for 1 month follow up. \par \par Today patient's BP is 169/712 on automatic vitals.\par \par Patient bring BP log from home. She uses a digital BP monitor.\par Example: \par Jan 19th 115/60 (AM) and 115/58 (PM)\par Jan 20th 110/42 (AM) and 148/46 (PM)\par Jan 21st 144/59 (AM) and 139/55 (PM)\par Jan 22nd 122/53 (AM) and 118/55 (PM)\par \par Also reports improvement in constipation. \par \par Manual blood pressure today 134/52. \par \par \par

## 2023-01-24 NOTE — PHYSICAL EXAM
[Well Developed] : well developed [Well Nourished] : well nourished [No Acute Distress] : no acute distress [Normal Conjunctiva] : normal conjunctiva [Normal Venous Pressure] : normal venous pressure [No Carotid Bruit] : no carotid bruit [Normal S1, S2] : normal S1, S2 [No Murmur] : no murmur [No Rub] : no rub [No Gallop] : no gallop [Clear Lung Fields] : clear lung fields [Good Air Entry] : good air entry [No Respiratory Distress] : no respiratory distress  [Soft] : abdomen soft [Non Tender] : non-tender [No Masses/organomegaly] : no masses/organomegaly [Normal Bowel Sounds] : normal bowel sounds [Normal Gait] : normal gait [No Edema] : no edema [No Cyanosis] : no cyanosis [No Clubbing] : no clubbing [No Varicosities] : no varicosities [No Rash] : no rash [No Skin Lesions] : no skin lesions [Moves all extremities] : moves all extremities [No Focal Deficits] : no focal deficits [Normal Speech] : normal speech [Alert and Oriented] : alert and oriented [Normal memory] : normal memory [de-identified] : No carotid bruits [de-identified] : No renal artery bruits

## 2023-01-24 NOTE — ASSESSMENT
[FreeTextEntry1] : 75yo woman with PMH of HTN, HLD, DMT2, hypothyroidism, GERD, glaucoma presenting for cardiology follow up appointment. Patient's primary issue is poorly controlled hypertension. Today BP is improved on higher dose of carvedilol. \par #HTN\par - Continue carvedilol 25 mg BID\par - Continue losartan 100 mg daily \par - Continue chlorthalidone 25 mg daily \par - Defer resistant/secondary HTN workup, suspect component of white coat hypertension\par -- No clinical signs or labs suggestive of hyperaldosteronism (normal K), hypercortisolism, or hyperparathyroid (normal K), CKD (normal Cr), or SOM (no renal bruit). Labs from PCP were reviewed.\par \par #HLD\par - Continue simvastatin 5 mg daily \par \par #DM2 and glaucoma \par - Continue follow up with PCP\par \par #Iron def anemia\par - Continue oral iron per PCP\par - Encouraged intake of leafy greens rich in iron\par \par #Constipation, improved \par - Continue senna per PCP\par - Continue colace 100 mg PO BID\par \par RTC 3 months\par \par Case discussed with clinic attending \par \par Tuan Pedraza MD\par Cardiology Fellow, PGY5

## 2023-04-11 ENCOUNTER — INPATIENT (INPATIENT)
Facility: HOSPITAL | Age: 78
LOS: 4 days | Discharge: HOME | DRG: 645 | End: 2023-04-16
Attending: INTERNAL MEDICINE | Admitting: INTERNAL MEDICINE
Payer: MEDICAID

## 2023-04-11 VITALS
TEMPERATURE: 98 F | SYSTOLIC BLOOD PRESSURE: 136 MMHG | DIASTOLIC BLOOD PRESSURE: 66 MMHG | HEART RATE: 71 BPM | OXYGEN SATURATION: 100 % | RESPIRATION RATE: 18 BRPM

## 2023-04-11 DIAGNOSIS — Z90.49 ACQUIRED ABSENCE OF OTHER SPECIFIED PARTS OF DIGESTIVE TRACT: Chronic | ICD-10-CM

## 2023-04-11 DIAGNOSIS — H27.113 SUBLUXATION OF LENS, BILATERAL: Chronic | ICD-10-CM

## 2023-04-11 DIAGNOSIS — E87.1 HYPO-OSMOLALITY AND HYPONATREMIA: ICD-10-CM

## 2023-04-11 LAB
ALBUMIN SERPL ELPH-MCNC: 3.8 G/DL — SIGNIFICANT CHANGE UP (ref 3.3–5)
ALP SERPL-CCNC: 71 U/L — SIGNIFICANT CHANGE UP (ref 40–120)
ALT FLD-CCNC: 12 U/L — SIGNIFICANT CHANGE UP (ref 10–45)
ANION GAP SERPL CALC-SCNC: 18 MMOL/L — HIGH (ref 5–17)
APPEARANCE UR: CLEAR — SIGNIFICANT CHANGE UP
AST SERPL-CCNC: 23 U/L — SIGNIFICANT CHANGE UP (ref 10–40)
BASE EXCESS BLDV CALC-SCNC: -3.8 MMOL/L — LOW (ref -2–3)
BASOPHILS # BLD AUTO: 0.05 K/UL — SIGNIFICANT CHANGE UP (ref 0–0.2)
BASOPHILS NFR BLD AUTO: 0.6 % — SIGNIFICANT CHANGE UP (ref 0–2)
BILIRUB SERPL-MCNC: 0.3 MG/DL — SIGNIFICANT CHANGE UP (ref 0.2–1.2)
BILIRUB UR-MCNC: NEGATIVE — SIGNIFICANT CHANGE UP
BUN SERPL-MCNC: 18 MG/DL — SIGNIFICANT CHANGE UP (ref 7–23)
CA-I SERPL-SCNC: 1.23 MMOL/L — SIGNIFICANT CHANGE UP (ref 1.15–1.33)
CALCIUM SERPL-MCNC: 9.2 MG/DL — SIGNIFICANT CHANGE UP (ref 8.4–10.5)
CHLORIDE BLDV-SCNC: 90 MMOL/L — LOW (ref 96–108)
CHLORIDE SERPL-SCNC: 87 MMOL/L — LOW (ref 96–108)
CO2 BLDV-SCNC: 21 MMOL/L — LOW (ref 22–26)
CO2 SERPL-SCNC: 16 MMOL/L — LOW (ref 22–31)
COLOR SPEC: COLORLESS — SIGNIFICANT CHANGE UP
CREAT SERPL-MCNC: 1.19 MG/DL — SIGNIFICANT CHANGE UP (ref 0.5–1.3)
DIFF PNL FLD: NEGATIVE — SIGNIFICANT CHANGE UP
EGFR: 47 ML/MIN/1.73M2 — LOW
EOSINOPHIL # BLD AUTO: 0.27 K/UL — SIGNIFICANT CHANGE UP (ref 0–0.5)
EOSINOPHIL NFR BLD AUTO: 3.2 % — SIGNIFICANT CHANGE UP (ref 0–6)
FLUAV AG NPH QL: SIGNIFICANT CHANGE UP
FLUBV AG NPH QL: SIGNIFICANT CHANGE UP
GAS PNL BLDV: 117 MMOL/L — CRITICAL LOW (ref 136–145)
GAS PNL BLDV: SIGNIFICANT CHANGE UP
GAS PNL BLDV: SIGNIFICANT CHANGE UP
GLUCOSE BLDV-MCNC: 177 MG/DL — HIGH (ref 70–99)
GLUCOSE SERPL-MCNC: 179 MG/DL — HIGH (ref 70–99)
GLUCOSE UR QL: NEGATIVE — SIGNIFICANT CHANGE UP
HCO3 BLDV-SCNC: 20 MMOL/L — LOW (ref 22–29)
HCT VFR BLD CALC: 26.5 % — LOW (ref 34.5–45)
HCT VFR BLDA CALC: 27 % — LOW (ref 34.5–46.5)
HGB BLD CALC-MCNC: 8.9 G/DL — LOW (ref 11.7–16.1)
HGB BLD-MCNC: 8.5 G/DL — LOW (ref 11.5–15.5)
IMM GRANULOCYTES NFR BLD AUTO: 0.6 % — SIGNIFICANT CHANGE UP (ref 0–0.9)
KETONES UR-MCNC: NEGATIVE — SIGNIFICANT CHANGE UP
LACTATE BLDV-MCNC: 1.9 MMOL/L — SIGNIFICANT CHANGE UP (ref 0.5–2)
LEUKOCYTE ESTERASE UR-ACNC: NEGATIVE — SIGNIFICANT CHANGE UP
LYMPHOCYTES # BLD AUTO: 0.91 K/UL — LOW (ref 1–3.3)
LYMPHOCYTES # BLD AUTO: 10.9 % — LOW (ref 13–44)
MAGNESIUM SERPL-MCNC: 1.6 MG/DL — SIGNIFICANT CHANGE UP (ref 1.6–2.6)
MCHC RBC-ENTMCNC: 27 PG — SIGNIFICANT CHANGE UP (ref 27–34)
MCHC RBC-ENTMCNC: 32.1 GM/DL — SIGNIFICANT CHANGE UP (ref 32–36)
MCV RBC AUTO: 84.1 FL — SIGNIFICANT CHANGE UP (ref 80–100)
MONOCYTES # BLD AUTO: 0.64 K/UL — SIGNIFICANT CHANGE UP (ref 0–0.9)
MONOCYTES NFR BLD AUTO: 7.7 % — SIGNIFICANT CHANGE UP (ref 2–14)
NEUTROPHILS # BLD AUTO: 6.42 K/UL — SIGNIFICANT CHANGE UP (ref 1.8–7.4)
NEUTROPHILS NFR BLD AUTO: 77 % — SIGNIFICANT CHANGE UP (ref 43–77)
NITRITE UR-MCNC: NEGATIVE — SIGNIFICANT CHANGE UP
NRBC # BLD: 0 /100 WBCS — SIGNIFICANT CHANGE UP (ref 0–0)
NT-PROBNP SERPL-SCNC: 165 PG/ML — SIGNIFICANT CHANGE UP (ref 0–300)
PCO2 BLDV: 32 MMHG — LOW (ref 39–42)
PH BLDV: 7.41 — SIGNIFICANT CHANGE UP (ref 7.32–7.43)
PH UR: 6.5 — SIGNIFICANT CHANGE UP (ref 5–8)
PLATELET # BLD AUTO: 285 K/UL — SIGNIFICANT CHANGE UP (ref 150–400)
PO2 BLDV: 50 MMHG — HIGH (ref 25–45)
POTASSIUM BLDV-SCNC: 4.5 MMOL/L — SIGNIFICANT CHANGE UP (ref 3.5–5.1)
POTASSIUM SERPL-MCNC: 4.6 MMOL/L — SIGNIFICANT CHANGE UP (ref 3.5–5.3)
POTASSIUM SERPL-SCNC: 4.6 MMOL/L — SIGNIFICANT CHANGE UP (ref 3.5–5.3)
PROT SERPL-MCNC: 7.2 G/DL — SIGNIFICANT CHANGE UP (ref 6–8.3)
PROT UR-MCNC: NEGATIVE — SIGNIFICANT CHANGE UP
RBC # BLD: 3.15 M/UL — LOW (ref 3.8–5.2)
RBC # FLD: 14 % — SIGNIFICANT CHANGE UP (ref 10.3–14.5)
RSV RNA NPH QL NAA+NON-PROBE: SIGNIFICANT CHANGE UP
SAO2 % BLDV: 83 % — SIGNIFICANT CHANGE UP (ref 67–88)
SARS-COV-2 RNA SPEC QL NAA+PROBE: SIGNIFICANT CHANGE UP
SODIUM SERPL-SCNC: 121 MMOL/L — LOW (ref 135–145)
SP GR SPEC: 1.02 — SIGNIFICANT CHANGE UP (ref 1.01–1.02)
TROPONIN T, HIGH SENSITIVITY RESULT: 7 NG/L — SIGNIFICANT CHANGE UP (ref 0–51)
UROBILINOGEN FLD QL: NEGATIVE — SIGNIFICANT CHANGE UP
WBC # BLD: 8.34 K/UL — SIGNIFICANT CHANGE UP (ref 3.8–10.5)
WBC # FLD AUTO: 8.34 K/UL — SIGNIFICANT CHANGE UP (ref 3.8–10.5)

## 2023-04-11 PROCEDURE — 99285 EMERGENCY DEPT VISIT HI MDM: CPT

## 2023-04-11 PROCEDURE — 71045 X-RAY EXAM CHEST 1 VIEW: CPT | Mod: 26

## 2023-04-11 PROCEDURE — 99223 1ST HOSP IP/OBS HIGH 75: CPT

## 2023-04-11 PROCEDURE — 70496 CT ANGIOGRAPHY HEAD: CPT | Mod: 26,MA

## 2023-04-11 PROCEDURE — 70498 CT ANGIOGRAPHY NECK: CPT | Mod: 26,MA

## 2023-04-11 RX ORDER — MECLIZINE HCL 12.5 MG
25 TABLET ORAL EVERY 8 HOURS
Refills: 0 | Status: DISCONTINUED | OUTPATIENT
Start: 2023-04-11 | End: 2023-04-16

## 2023-04-11 RX ADMIN — Medication 25 MILLIGRAM(S): at 23:26

## 2023-04-11 NOTE — CONSULT NOTE ADULT - SUBJECTIVE AND OBJECTIVE BOX
Neurology - Consult Note    -  Spectra: [phone] ([facility]), [phone] ([facility])  -    HPI: Patient ELLEN MOORE is a 77y (1945) woman with a PMHx significant for DM, HTN, HLD presented to the ED for dizziness of three weeks. Pt stated her dizziness (room spinning) occurs during movements (i.ie sitting to standing) and resolves after prolong rest. Tried meclizine 25mg for the past few days from pcp but does not help. intermittent in nature. Had fallen twice during episodes but does not feel unbalanced when not symptomatic. Had right sided posterior HA however had recent infection in that location as well as right ear s/p abx. Denied numbness, weakness, vision changes, hearing loss, slurred speech. no prior similar episodes no h/o stroke. Takes daily asa 81mg. At time of examiniatoin not symtpomatic and at neurological baseline aside from right posterior head pain   NIHSS: 0  Baseline mRS: 0    Review of Systems:     CONSTITUTIONAL: No fevers or chills  EYES AND ENT: No visual changes or no throat pain   NECK: No pain or stiffness  RESPIRATORY: No hemoptysis or shortness of breath  CARDIOVASCULAR: No chest pain or palpitations  GASTROINTESTINAL: No melena or hematochezia  GENITOURINARY: No dysuria or hematuria  NEUROLOGICAL: +As stated in HPI above  SKIN: No itching, burning, rashes, or lesions   All other review of systems is negative unless indicated above.    Allergies:  eggs (Other)  eggs (Stomach Upset)  No Known Drug Allergies      PMHx/PSHx/Family Hx: As above, otherwise see below   DM (diabetes mellitus)    HTN (hypertension)    HLD (hyperlipidemia)    Hypothyroid    GERD (gastroesophageal reflux disease)    Glaucoma    Hypertension    Diabetes mellitus    Hypothyroidism       Medications:  MEDICATIONS  (STANDING):    MEDICATIONS  (PRN):        Home Medications:  amLODIPine 10 mg oral tablet: 1 tab(s) orally once a day (01 Nov 2018 15:47)  Aspirin Enteric Coated 81 mg oral delayed release tablet: 1 tab(s) orally once a day (01 Nov 2018 15:47)  atenolol 50 mg oral tablet: 1 tab(s) orally once a day (01 Nov 2018 15:47)  atorvastatin 10 mg oral tablet: 1 tab(s) orally once a day (01 Nov 2018 15:47)  glimepiride 2 mg oral tablet: 1-2 tab(s) orally once a day (01 Nov 2018 15:47)  latanoprost 0.005% ophthalmic solution: 1 drop(s) to each affected eye once a day (in the evening) (01 Nov 2018 15:47)  Liquitears preserved ophthalmic solution: 1 drop(s) to each affected eye 3 times a day, As Needed (01 Nov 2018 15:47)  metFORMIN 850 mg oral tablet: 1 tab(s) orally 2 times a day, Restart on 11/3 (01 Nov 2018 15:47)  Vitamin B12 1000 mcg oral tablet: 1 tab(s) orally once a day (01 Nov 2018 15:47)  Vitamin C 500 mg oral tablet: 1 tab(s) orally once a day (01 Nov 2018 15:47)      Vitals:  T(C): 36.9 (04-11-23 @ 15:02), Max: 36.9 (04-11-23 @ 15:02)  HR: 65 (04-11-23 @ 17:08) (65 - 74)  BP: 114/70 (04-11-23 @ 15:02) (114/70 - 114/70)  RR: 17 (04-11-23 @ 17:08) (17 - 18)  SpO2: 99% (04-11-23 @ 17:08) (99% - 99%)    Physical Examination:    General - NAD  Cardiovascular - Peripheral pulses palpable, no edema    Neurologic Exam:  Mental status - Awake, Alert, Oriented to person, place, and time. Speech fluent, repetition and naming intact. Follows simple and complex commands.     Cranial nerves - PERRL, VFF, EOMI, face sensation (V1-V3) intact LT, No facial asymmetry b/l, hearing grossly intact b/l, trapezius 5/5 strength b/l, tongue midline on protrusion     Motor - Normal bulk and tone throughout. No pronator drift.  Strength testing            Deltoid      Biceps      Triceps     Wrist Extension    Wrist Flexion       R            5                 5               5                     5                              5                        5  L             5                 5               5                     5                              5                       5              Hip Flexion    Hip Extension    Knee Flexion    Knee Extension    Dorsiflexion    Plantar Flexion  R              5                           5                       5                           5                            5                          5  L              5                           5                        5                           5                            5                          5    Sensation - Light touch/temperature intact throughout    DTR's -             Biceps      Triceps     Brachioradialis      Patellar    Ankle    Toes/plantar response  R             2+             2+                  2+                       2+            2+                 Down  L              2+             2+                 2+                        2+           2+                 Down    Coordination - Finger to Nose intact b/l. No tremors appreciated    Gait and station - Normal casual gait. Romberg (-)    Labs:                        8.5    8.34  )-----------( 285      ( 11 Apr 2023 17:08 )             26.5           CAPILLARY BLOOD GLUCOSE      POCT Blood Glucose.: 225 mg/dL (11 Apr 2023 16:05)       Radiology:     Neurology - Consult Note    -  Spectra: [phone] ([facility]), [phone] ([facility])  -    HPI: Patient ELLEN MOORE is a 77y (1945) woman with a PMHx significant for DM, HTN, HLD presented to the ED for dizziness of three weeks. Pt stated her dizziness (room spinning) occurs during movements (i.ie sitting to standing) and resolves after prolong rest. Tried meclizine 25mg for the past few days from pcp but does not help. intermittent in nature. Had fallen twice during episodes but does not feel unbalanced when not symptomatic. Had right sided posterior HA however had recent infection in that location as well as right ear s/p abx. Denied numbness, weakness, vision changes, hearing loss, slurred speech. no prior similar episodes no h/o stroke. Takes daily asa 81mg. At time of examiniatoin not symtpomatic and at neurological baseline aside from right posterior head pain   NIHSS: 0  Baseline mRS: 0    Review of Systems:     CONSTITUTIONAL: No fevers or chills  EYES AND ENT: No visual changes or no throat pain   NECK: No pain or stiffness  RESPIRATORY: No hemoptysis or shortness of breath  CARDIOVASCULAR: No chest pain or palpitations  GASTROINTESTINAL: No melena or hematochezia  GENITOURINARY: No dysuria or hematuria  NEUROLOGICAL: +As stated in HPI above  SKIN: No itching, burning, rashes, or lesions   All other review of systems is negative unless indicated above.    Allergies:  eggs (Other)  eggs (Stomach Upset)  No Known Drug Allergies      PMHx/PSHx/Family Hx: As above, otherwise see below   DM (diabetes mellitus)    HTN (hypertension)    HLD (hyperlipidemia)    Hypothyroid    GERD (gastroesophageal reflux disease)    Glaucoma    Hypertension    Diabetes mellitus    Hypothyroidism       Medications:  MEDICATIONS  (STANDING):    MEDICATIONS  (PRN):        Home Medications:  amLODIPine 10 mg oral tablet: 1 tab(s) orally once a day (01 Nov 2018 15:47)  Aspirin Enteric Coated 81 mg oral delayed release tablet: 1 tab(s) orally once a day (01 Nov 2018 15:47)  atenolol 50 mg oral tablet: 1 tab(s) orally once a day (01 Nov 2018 15:47)  atorvastatin 10 mg oral tablet: 1 tab(s) orally once a day (01 Nov 2018 15:47)  glimepiride 2 mg oral tablet: 1-2 tab(s) orally once a day (01 Nov 2018 15:47)  latanoprost 0.005% ophthalmic solution: 1 drop(s) to each affected eye once a day (in the evening) (01 Nov 2018 15:47)  Liquitears preserved ophthalmic solution: 1 drop(s) to each affected eye 3 times a day, As Needed (01 Nov 2018 15:47)  metFORMIN 850 mg oral tablet: 1 tab(s) orally 2 times a day, Restart on 11/3 (01 Nov 2018 15:47)  Vitamin B12 1000 mcg oral tablet: 1 tab(s) orally once a day (01 Nov 2018 15:47)  Vitamin C 500 mg oral tablet: 1 tab(s) orally once a day (01 Nov 2018 15:47)      Vitals:  T(C): 36.9 (04-11-23 @ 15:02), Max: 36.9 (04-11-23 @ 15:02)  HR: 65 (04-11-23 @ 17:08) (65 - 74)  BP: 114/70 (04-11-23 @ 15:02) (114/70 - 114/70)  RR: 17 (04-11-23 @ 17:08) (17 - 18)  SpO2: 99% (04-11-23 @ 17:08) (99% - 99%)    Physical Examination:    General - NAD  Cardiovascular - Peripheral pulses palpable, no edema    Neurologic Exam:  Mental status - Awake, Alert, Oriented to person, place, and time. Speech fluent, repetition and naming intact. Follows simple and complex commands.     Cranial nerves - PERRL, VFF, EOMI, face sensation (V1-V3) intact LT, No facial asymmetry b/l, hearing grossly intact b/l, trapezius 5/5 strength b/l, tongue midline on protrusion     Motor - Normal bulk and tone throughout. No pronator drift.  Strength testing            Deltoid      Biceps      Triceps     Wrist Extension    Wrist Flexion       R            5                 5               5                     5                              5                        5  L             5                 5               5                     5                              5                       5              Hip Flexion    Hip Extension    Knee Flexion    Knee Extension    Dorsiflexion    Plantar Flexion  R              5                           5                       5                           5                            5                          5  L              5                           5                        5                           5                            5                          5    Sensation - Light touch/temperature intact throughout    DTR's -             Biceps      Triceps     Brachioradialis      Patellar    Ankle    Toes/plantar response  R             2+             2+                  2+                       2+            2+                 Down  L              2+             2+                 2+                        2+           2+                 Down    Coordination - Finger to Nose intact b/l. No tremors appreciated    Gait and station - Normal casual gait. Romberg (-)    Labs:                        8.5    8.34  )-----------( 285      ( 11 Apr 2023 17:08 )             26.5           CAPILLARY BLOOD GLUCOSE      POCT Blood Glucose.: 225 mg/dL (11 Apr 2023 16:05)       Radiology:  < from: CT Angio Head w/ IV Cont (04.11.23 @ 18:23) >  IMPRESSION:  CT BRAIN:  No acute intracranial mass effect, hemorrhage, midline shift or   extra-axial fluid collection.    CT ANGIOGRAPHY NECK:  No evidence of hemodynamically significant stenosis using NASCET   criteria. Patent vertebral arteries. No evidence of vascular dissection.    CT ANGIOGRAPHY BRAIN:  No major vessel occlusion or proximal stenosis.    < end of copied text >

## 2023-04-11 NOTE — ED PROVIDER NOTE - ATTENDING APP SHARED VISIT CONTRIBUTION OF CARE
Private Physician PADDY Persaud, PCP  77y female pmh DM on metformin, GERD, HLD, HTN, Hypothyroid. PT comes to ed c/o infection one month ago to scalp after reaction to hair dye. Seen by pmd and tx w abx, and resolved. Subsequently developed dizziness/room spinning. Tx w antivert. w/o improvement. Pt has fallen twice today. Nausea w/o vomiting. Worse w head turning. No abd pain. cp. shortness of breath, cough. Private Physician PADDY Persaud, PCP  77y female pmh DM on metformin, GERD, HLD, HTN, Hypothyroid. PT comes to ed c/o infection one month ago to scalp after reaction to hair dye. Seen by pmd and tx w abx, and resolved. Subsequently developed dizziness/room spinning. Tx w antivert. w/o improvement. Pt has fallen twice today. Nausea w/o vomiting. Worse w head turning. No abd pain. cp. shortness of breath, cough. PE adult female awake alert normocephalic atraumatic neck supple chest clear anterior & posterior cv no rubs, gallops or murmurs abd soft +bs no mass guarding rebound neuro gcs 15 speech fluent power 5/5 all ext cn2-12 intact sensation normal  Antonio Gardner MD, Facep

## 2023-04-11 NOTE — H&P ADULT - ASSESSMENT
77y F pmh htn, dm, hld presents for dizziness (room spinning)  x 3 weeks and inability to ambulate, found to have hyponatremia admitted for further care

## 2023-04-11 NOTE — H&P ADULT - PROBLEM SELECTOR PLAN 5
- Hold home DM meds ( Glimepiride, Metformin)   - ISS ACHS  - Check FS, adjust insulin accordingly   - DASH/CC diet  - Check A1c

## 2023-04-11 NOTE — ED PROVIDER NOTE - NSICDXPASTSURGICALHX_GEN_ALL_CORE_FT
PAST SURGICAL HISTORY:  No significant past surgical history     Status post cholecystectomy     Subluxed cataract of both eyes

## 2023-04-11 NOTE — H&P ADULT - NSHPLABSRESULTS_GEN_ALL_CORE
8.5    8.34  )-----------( 285      ( 11 Apr 2023 17:08 )             26.5       04-11    121<L>  |  87<L>  |  18  ----------------------------<  179<H>  4.6   |  16<L>  |  1.19    Ca    9.2      11 Apr 2023 17:08  Mg     1.6     04-11    TPro  7.2  /  Alb  3.8  /  TBili  0.3  /  DBili  x   /  AST  23  /  ALT  12  /  AlkPhos  71  04-11      Magnesium, Serum: 1.6  (04.11.23 @ 17:08)  Troponin T, High Sensitivity Result: 7: Specimen not hemolyzed (04.11.23 @ 17:08)  Pro-Brain Natriuretic Peptide: 165 pg/mL (04.11.23 @ 17:08)    Flu With COVID-19 By ERIK (04.11.23 @ 17:07)    SARS-CoV-2 Result: NotDetec    Influenza A Result: NotDetec    Influenza B Result: NotDetec    Resp Syn Virus Result: NotDetec      EKG personally reviewed:  NSR 70bpm    Images personally reviewed:   < from: Xray Chest 1 View- PORTABLE-Urgent (04.11.23 @ 17:03) >    IMPRESSION: Clear lungs.    < from: CT Head No Cont (04.11.23 @ 18:22) >  IMPRESSION:  CT BRAIN:  No acute intracranial mass effect, hemorrhage, midline shift or   extra-axial fluid collection.    CT ANGIOGRAPHY NECK:  No evidence of hemodynamically significant stenosis using NASCET   criteria. Patent vertebral arteries. No evidence of vascular dissection.    CT ANGIOGRAPHY BRAIN:  No major vessel occlusion or proximal stenosis.

## 2023-04-11 NOTE — ED PROVIDER NOTE - NSICDXPASTMEDICALHX_GEN_ALL_CORE_FT
PAST MEDICAL HISTORY:  Diabetes mellitus     DM (diabetes mellitus)     GERD (gastroesophageal reflux disease)     Glaucoma     HLD (hyperlipidemia)     HTN (hypertension)     Hypertension     Hypothyroid     Hypothyroidism

## 2023-04-11 NOTE — H&P ADULT - PROBLEM SELECTOR PLAN 2
C/o dizziness (room spinning) w/ associated difficulty ambulating and weakness  Had  right side posterior HA & recent right ear infection s/p abx    - Reports sx w/movement, improves w/rest   - Check Ortho BP   - Change position slowly   - C/w Meclizine   - Fall precaution   - PT  - Will benefit from vestibular rehab on discharge

## 2023-04-11 NOTE — ED PROVIDER NOTE - NSICDXFAMILYHX_GEN_ALL_CORE_FT
FAMILY HISTORY:  No pertinent family history in first degree relatives    Father  Still living? Unknown  Family history of heart attack, Age at diagnosis: Age Unknown

## 2023-04-11 NOTE — ED PROVIDER NOTE - NS ED ATTENDING STATEMENT MOD
This was a shared visit with the DIANELYS. I reviewed and verified the documentation and independently performed the documented:

## 2023-04-11 NOTE — ED ADULT NURSE NOTE - OBJECTIVE STATEMENT
First encounter with the pt, pt received from triage with complaints of dizziness. Pt states that she has had a recent dx of vertigo and given meclizine but not helping. Pt is a/ox4 and verbal. Speech is clear and able to speak in full sentences without distress. Airway is patent with no obstruction nor blocking secretions. Breathing is even and unlabored on room air. Pt has strong pulses palpated bilaterally. Pt is SR on the cardiac monitor. Neuro check is wnl. Full rom. Pt denies chest pain, n/v and sob. No acute distress noted. Pt has call light within the reach of the pt at the bedside. Will continue to monitor the pt.

## 2023-04-11 NOTE — H&P ADULT - HISTORY OF PRESENT ILLNESS
77y F pmh htn, dm, hld presents for c/o dizziness ( room spinning  x 3 weeks. Patient states that she saw her doctor few days ago and was placed on meclizine, has been taking med as directed  x4d w/minimal sx improvement. She also reports associated weakness, endorsed falling over past 2d, denies injury or head trauma.  She is unable to walk due to symptoms.       Denies CP, SOB, palpitation, N/V/D, fever, cough, chills, abm pain, recent travel, sick contact, change in bowel and urinary habits    77y F pmh htn, dm, hld presents for c/o dizziness (room spinning)  x 3 weeks. Patient states that she saw her doctor few days ago and was placed on meclizine, has been taking med as directed  x4d w/minimal sx improvement. She also reports associated weakness, endorsed falling over past 2d, denies injury or head trauma.  She is unable to walk due to symptoms.  Of note she had  right side posterior HA & recent right ear infection s/p abx        Denies CP, SOB, palpitation, N/V/D, fever, cough, chills, abm pain, recent travel, sick contact, change in bowel and urinary habits    77y F pmh htn, dm, hld presents for c/o dizziness (room spinning)  x 3 weeks. Patient states that she saw her doctor few days ago and was placed on meclizine, has been taking med as directed  x4d w/minimal sx improvement. She also reports associated weakness, endorsed falling over past 2d, denies injury or head trauma.  She is unable to walk due to symptoms.  Of note she had  right side posterior HA & recent right ear infection  apx 1mo ago, s/p abx course        Denies CP, SOB, palpitation, N/V/D, fever, cough, chills, abm pain, recent travel, sick contact, change in bowel and urinary habits

## 2023-04-11 NOTE — ED ADULT NURSE NOTE - NS_SISCREENINGSR_GEN_ALL_ED
Complex Repair And V-Y Plasty Text: The defect edges were debeveled with a #15 scalpel blade.  The primary defect was closed partially with a complex linear closure.  Given the location of the remaining defect, shape of the defect and the proximity to free margins a V-Y plasty was deemed most appropriate for complete closure of the defect.  Using a sterile surgical marker, an appropriate advancement flap was drawn incorporating the defect and placing the expected incisions within the relaxed skin tension lines where possible.    The area thus outlined was incised deep to adipose tissue with a #15 scalpel blade.  The skin margins were undermined to an appropriate distance in all directions utilizing iris scissors. Negative

## 2023-04-11 NOTE — CONSULT NOTE ADULT - ASSESSMENT
78 yo (1945) woman with a PMHx significant for DM, HTN, HLD presented to the ED for dizziness of three weeks. Pt stated her dizziness (room spinning) occurs during movements (i.ie sitting to standing) and resolves after prolong rest. Tried meclizine 25mg for the past few days from pcp but does not help. intermittent in nature. Had fallen twice during episodes but does not feel unbalanced when not symptomatic. Had right sided posterior HA however had recent infection in that location as well as right ear s/p abx. Denied numbness, weakness, vision changes, hearing loss, slurred speech. no prior similar episodes no h/o stroke. Takes daily asa 81mg. At time of examiniatoin not symtpomatic and at neurological baseline aside from right posterior head pain   NIHSS: 0    Impression:   Acute vestibular syndrome likely of peripheral etiology however cannot definitively r/o acute ischemic stroke at this time (although based on clinical story less likely)     Recommendations:  [] CTH and CTA h/n    [] Continue with home asprin  [] BP measurements in both arms + orthostatic VS  [] EKG to evaluate for arrhythmias  [] Consider IVFs  [] Can Meclizine increase to 50mg Q8  [] Can try Ondansetron 8mg up to q8hr PRN OR Reglan 4mg PRN q8h PRN for N/V  [] Can try Clonzepam 0.5mg now then Q8H PRN (for 2 to 3 days)    Other:  [] Refer for Vestibular Rehab on discharge  [] Epley manuever print out  [] ENT f/u outpatient  [] Neurology f/u outpatient       78 yo (1945) woman with a PMHx significant for DM, HTN, HLD presented to the ED for dizziness of three weeks. Pt stated her dizziness (room spinning) occurs during movements (i.ie sitting to standing) and resolves after prolong rest. Tried meclizine 25mg for the past few days from pcp but does not help. intermittent in nature. Had fallen twice during episodes but does not feel unbalanced when not symptomatic. Had right sided posterior HA however had recent infection in that location as well as right ear s/p abx. Denied numbness, weakness, vision changes, hearing loss, slurred speech. no prior similar episodes no h/o stroke. Takes daily asa 81mg. At time of examiniatoin not symtpomatic and at neurological baseline aside from right posterior head pain   NIHSS: 0    Impression:   Acute vestibular syndrome likely of peripheral etiology however cannot definitively r/o acute ischemic stroke at this time (although based on clinical story less likely)     Recommendations:  [] CTH and CTA h/n    [] Continue with home asprin  [] BP measurements in both arms + orthostatic VS  [] EKG to evaluate for arrhythmias  [] Consider IVFs  [] Can try increasing Meclizine to 50mg Q8  [] Can try Ondansetron 8mg up to q8hr PRN OR Reglan 4mg PRN q8h PRN for N/V  [] Can try Clonzepam 0.5mg now then Q8H PRN (for 2 to 3 days)    Other:  [] Refer for Vestibular Rehab on discharge  [] Epley manuever print out  [] ENT f/u outpatient  [] Neurology f/u outpatient       76 yo (1945) woman with a PMHx significant for DM, HTN, HLD presented to the ED for dizziness of three weeks. Pt stated her dizziness (room spinning) occurs during movements (i.ie sitting to standing) and resolves after prolong rest. Tried meclizine 25mg for the past few days from pcp but does not help. intermittent in nature. Had fallen twice during episodes but does not feel unbalanced when not symptomatic. Had right sided posterior HA however had recent infection in that location as well as right ear s/p abx. Denied numbness, weakness, vision changes, hearing loss, slurred speech. no prior similar episodes no h/o stroke. Takes daily asa 81mg. At time of examination not symptomatic and at neurological baseline aside from right posterior head pain   NIHSS: 0    Impression:   Acute vestibular syndrome likely of peripheral etiology however cannot definitively r/o acute ischemic stroke at this time (although based on clinical story less likely)     Recommendations:  [] CTH and CTA h/n    [] Cxr, ua  [] Continue with home asprin  [] BP measurements in both arms + orthostatic VS  [] EKG to evaluate for arrhythmias  [] Consider IVFs  [] C/w Meclizine to 25mg Q8  [] Can try Ondansetron 8mg up to q8hr PRN OR Reglan 4mg PRN q8h PRN for N/V  [] Can try valium at night 5mg prn for 6 days if negative ct scans     Other:  [] Refer for Vestibular Rehab on discharge  [] Epley manuever print out  [] ENT f/u outpatient  [] Neurology f/u outpatient       78 yo (1945) woman with a PMHx significant for DM, HTN, HLD presented to the ED for dizziness of three weeks. Pt stated her dizziness (room spinning) occurs during movements (i.ie sitting to standing) and resolves after prolong rest. Tried meclizine 25mg for the past few days from pcp but does not help. intermittent in nature. Had fallen twice during episodes but does not feel unbalanced when not symptomatic. Had right sided posterior HA however had recent infection in that location as well as right ear s/p abx. Denied numbness, weakness, vision changes, hearing loss, slurred speech. no prior similar episodes no h/o stroke. Takes daily asa 81mg. At time of examination not symptomatic and at neurological baseline aside from right posterior head pain   NIHSS: 0    Impression:   Acute vestibular syndrome likely of peripheral etiology however cannot definitively r/o acute ischemic stroke at this time (although based on clinical story less likely)     Recommendations:  [] Cxr, ua  [] Continue with home asprin  [] BP measurements in both arms + orthostatic VS  [] EKG to evaluate for arrhythmias  [] Consider IVFs  [] C/w Meclizine to 25mg Q8  [] Can try Ondansetron 8mg up to q8hr PRN OR Reglan 4mg PRN q8h PRN for N/V  [] Can try valium at night 5mg prn for 6 days if negative ct scans     Other:  [] Refer for Vestibular Rehab on discharge  [] Epley manuever print out  [] ENT f/u outpatient  [] Neurology f/u outpatient      Discussed with Dr. Ruth attending.

## 2023-04-11 NOTE — ED ADULT NURSE REASSESSMENT NOTE - NS ED NURSE REASSESS COMMENT FT1
report received from Sepideh RN, pt A&Ox3, pt on cardiac monitor, pt denies pain and discomfort at this time, pt resting comfortably, skin clean and intact, pt safety and comfort provided.

## 2023-04-11 NOTE — ED PROVIDER NOTE - OBJECTIVE STATEMENT
78 y/o female with PMHx of htn, dm, hld presents to the ed complaining of dizziness x 3 weeks. Patient states that she saw her doctor few days ago and was placed on meclizine. SHe has been taking as directed for four days but has not had any improvement. She feels generalized weakness and dizziness like the room is spinning. She states that she fell a few times over the past two days. She denies head trauma or any injury. She is unable to walk due to symptoms. No headache, chest pain, fever, chills, cough, sob, n/v/d.

## 2023-04-11 NOTE — H&P ADULT - PROBLEM SELECTOR PLAN 1
- Na 121 >> 122 when corrected for glucose   - Appears to have chronic HypoNa 124 -128 in 2021  - Clinically euvolemic, not on TZD   - DDx: SIADH vs Hypertonic hyponatremia 2/2 DM vs Thyroid dz   - Check Ur Na, Ur Osm,  Serum Osm   - Check TSH - Na 121 >> 122 when corrected for glucose   - Appears to have chronic HypoNa 124 -128 in 2021  - Clinically euvolemic  - DDx: SIADH vs Hypertonic hyponatremia 2/2 DM vs Thyroid dz   - Check Ur Na, Ur Osm,  Serum Osm   - Check TSH  - Hold home med Chlorthalidone

## 2023-04-11 NOTE — PATIENT PROFILE ADULT - FALL HARM RISK - HARM RISK INTERVENTIONS
Assistance with ambulation/Assistance OOB with selected safe patient handling equipment/Communicate Risk of Fall with Harm to all staff/Monitor gait and stability/Orthostatic vital signs/Provide patient with walking aids - walker, cane, crutches/Reinforce activity limits and safety measures with patient and family/Review medications for side effects contributing to fall risk/Sit up slowly, dangle for a short time, stand at bedside before walking/Tailored Fall Risk Interventions/Toileting schedule using arm’s reach rule for commode and bathroom/Use of alarms - bed, chair and/or voice tab/Visual Cue: Yellow wristband and red socks/Bed in lowest position, wheels locked, appropriate side rails in place/Call bell, personal items and telephone in reach/Instruct patient to call for assistance before getting out of bed or chair/Non-slip footwear when patient is out of bed/[place] to call system/Physically safe environment - no spills, clutter or unnecessary equipment/Purposeful Proactive Rounding/Room/bathroom lighting operational, light cord in reach

## 2023-04-11 NOTE — ED ADULT TRIAGE NOTE - CHIEF COMPLAINT QUOTE
pt with dizziness for approx 3 weeks being tx by md for vertigo son states fall yesterday x 2 and today x 2 pt on meclezine gabapentin and meloxicam slight left droop noted son states unknown amt of time present

## 2023-04-11 NOTE — ED PROVIDER NOTE - PHYSICAL EXAMINATION
CONSTITUTIONAL: Patient is awake, alert and oriented x 3. Patient is well appearing and in no acute distress.  HEAD: NCAT  EYES: PERRL bilaterally, EOMI,   ENT: Airway patent, Nasal mucosa clear.   NECK: Supple, No LAD,  LUNGS: CTA B/L, no wheezes, rhonci or rales  HEART: RRR.+S1S2   ABDOMEN: Soft, non-tender to palpation throughout all four quadrants  MSK:  FROM upper and lower ext b/l,   SKIN: No rash or lesions  NEURO: CN 3-12 grossly intact, No focal deficits, Strength5/5 UE and LE b/l; Sensation intact; Gait normal, no facial droop, (-) protonator drift; speaking in full sentences;

## 2023-04-11 NOTE — H&P ADULT - NSHPPHYSICALEXAM_GEN_ALL_CORE
T(C): 36.7 (04-11-23 @ 19:15), Max: 36.9 (04-11-23 @ 15:02)  HR: 77 (04-11-23 @ 19:15) (65 - 77)  BP: 152/69 (04-11-23 @ 19:15) (114/70 - 152/69)  RR: 16 (04-11-23 @ 19:15) (14 - 18)  SpO2: 99% (04-11-23 @ 19:15) (99% - 100%)    CONSTITUTIONAL: Well groomed, no apparent distress  EYES: PERRLA and symmetric, EOMI  ENMT: MMM  RESP: No respiratory distress,  CTA b/l  CV: +S1S2, RRR, no MRG; no peripheral edema  GI: Soft, NTND, no RGR  MSK: Normal pain free ROM x4 extremities   SKIN: No rashes or ulcers noted  NEURO: CN II-XII grossly intact; normal reflexes, sensation intact throughout   PSYCH: A+O x 3, mood and affect appropriate

## 2023-04-11 NOTE — ED PROVIDER NOTE - CLINICAL SUMMARY MEDICAL DECISION MAKING FREE TEXT BOX
Elderly female complains of dizziness w/o vertigo. CT neg labs reveale na 121 (verbal from lab) Pt tba for villagran of same possible siadh.   Antonio Gardner MD, Facep

## 2023-04-12 DIAGNOSIS — Z29.9 ENCOUNTER FOR PROPHYLACTIC MEASURES, UNSPECIFIED: ICD-10-CM

## 2023-04-12 DIAGNOSIS — E87.1 HYPO-OSMOLALITY AND HYPONATREMIA: ICD-10-CM

## 2023-04-12 DIAGNOSIS — E03.9 HYPOTHYROIDISM, UNSPECIFIED: ICD-10-CM

## 2023-04-12 DIAGNOSIS — K21.9 GASTRO-ESOPHAGEAL REFLUX DISEASE WITHOUT ESOPHAGITIS: ICD-10-CM

## 2023-04-12 DIAGNOSIS — H91.90 UNSPECIFIED HEARING LOSS, UNSPECIFIED EAR: ICD-10-CM

## 2023-04-12 DIAGNOSIS — E11.9 TYPE 2 DIABETES MELLITUS WITHOUT COMPLICATIONS: ICD-10-CM

## 2023-04-12 DIAGNOSIS — I10 ESSENTIAL (PRIMARY) HYPERTENSION: ICD-10-CM

## 2023-04-12 DIAGNOSIS — R42 DIZZINESS AND GIDDINESS: ICD-10-CM

## 2023-04-12 DIAGNOSIS — E78.5 HYPERLIPIDEMIA, UNSPECIFIED: ICD-10-CM

## 2023-04-12 LAB
A1C WITH ESTIMATED AVERAGE GLUCOSE RESULT: 7 % — HIGH (ref 4–5.6)
ALBUMIN SERPL ELPH-MCNC: 3.9 G/DL — SIGNIFICANT CHANGE UP (ref 3.3–5)
ALP SERPL-CCNC: 67 U/L — SIGNIFICANT CHANGE UP (ref 40–120)
ALT FLD-CCNC: 12 U/L — SIGNIFICANT CHANGE UP (ref 10–45)
ANION GAP SERPL CALC-SCNC: 10 MMOL/L — SIGNIFICANT CHANGE UP (ref 5–17)
ANION GAP SERPL CALC-SCNC: 13 MMOL/L — SIGNIFICANT CHANGE UP (ref 5–17)
AST SERPL-CCNC: 12 U/L — SIGNIFICANT CHANGE UP (ref 10–40)
BILIRUB SERPL-MCNC: 0.4 MG/DL — SIGNIFICANT CHANGE UP (ref 0.2–1.2)
BUN SERPL-MCNC: 15 MG/DL — SIGNIFICANT CHANGE UP (ref 7–23)
BUN SERPL-MCNC: 21 MG/DL — SIGNIFICANT CHANGE UP (ref 7–23)
CALCIUM SERPL-MCNC: 9.3 MG/DL — SIGNIFICANT CHANGE UP (ref 8.4–10.5)
CALCIUM SERPL-MCNC: 9.6 MG/DL — SIGNIFICANT CHANGE UP (ref 8.4–10.5)
CHLORIDE SERPL-SCNC: 93 MMOL/L — LOW (ref 96–108)
CHLORIDE SERPL-SCNC: 94 MMOL/L — LOW (ref 96–108)
CO2 SERPL-SCNC: 21 MMOL/L — LOW (ref 22–31)
CO2 SERPL-SCNC: 21 MMOL/L — LOW (ref 22–31)
CREAT SERPL-MCNC: 1.13 MG/DL — SIGNIFICANT CHANGE UP (ref 0.5–1.3)
CREAT SERPL-MCNC: 1.26 MG/DL — SIGNIFICANT CHANGE UP (ref 0.5–1.3)
EGFR: 44 ML/MIN/1.73M2 — LOW
EGFR: 50 ML/MIN/1.73M2 — LOW
ESTIMATED AVERAGE GLUCOSE: 154 MG/DL — HIGH (ref 68–114)
GLUCOSE BLDC GLUCOMTR-MCNC: 144 MG/DL — HIGH (ref 70–99)
GLUCOSE BLDC GLUCOMTR-MCNC: 213 MG/DL — HIGH (ref 70–99)
GLUCOSE BLDC GLUCOMTR-MCNC: 257 MG/DL — HIGH (ref 70–99)
GLUCOSE BLDC GLUCOMTR-MCNC: 259 MG/DL — HIGH (ref 70–99)
GLUCOSE SERPL-MCNC: 101 MG/DL — HIGH (ref 70–99)
GLUCOSE SERPL-MCNC: 204 MG/DL — HIGH (ref 70–99)
HCT VFR BLD CALC: 26.4 % — LOW (ref 34.5–45)
HCV AB S/CO SERPL IA: 0.14 S/CO — SIGNIFICANT CHANGE UP (ref 0–0.99)
HCV AB SERPL-IMP: SIGNIFICANT CHANGE UP
HGB BLD-MCNC: 8.8 G/DL — LOW (ref 11.5–15.5)
INR BLD: 1.14 RATIO — SIGNIFICANT CHANGE UP (ref 0.88–1.16)
MCHC RBC-ENTMCNC: 27.2 PG — SIGNIFICANT CHANGE UP (ref 27–34)
MCHC RBC-ENTMCNC: 33.3 GM/DL — SIGNIFICANT CHANGE UP (ref 32–36)
MCV RBC AUTO: 81.5 FL — SIGNIFICANT CHANGE UP (ref 80–100)
NRBC # BLD: 0 /100 WBCS — SIGNIFICANT CHANGE UP (ref 0–0)
OSMOLALITY SERPL: 266 MOSMOL/KG — LOW (ref 280–301)
OSMOLALITY UR: 305 MOS/KG — SIGNIFICANT CHANGE UP (ref 300–900)
PLATELET # BLD AUTO: 344 K/UL — SIGNIFICANT CHANGE UP (ref 150–400)
POTASSIUM SERPL-MCNC: 4.5 MMOL/L — SIGNIFICANT CHANGE UP (ref 3.5–5.3)
POTASSIUM SERPL-MCNC: 4.5 MMOL/L — SIGNIFICANT CHANGE UP (ref 3.5–5.3)
POTASSIUM SERPL-SCNC: 4.5 MMOL/L — SIGNIFICANT CHANGE UP (ref 3.5–5.3)
POTASSIUM SERPL-SCNC: 4.5 MMOL/L — SIGNIFICANT CHANGE UP (ref 3.5–5.3)
PROT SERPL-MCNC: 7.3 G/DL — SIGNIFICANT CHANGE UP (ref 6–8.3)
PROTHROM AB SERPL-ACNC: 13.1 SEC — SIGNIFICANT CHANGE UP (ref 10.5–13.4)
RBC # BLD: 3.24 M/UL — LOW (ref 3.8–5.2)
RBC # FLD: 14.1 % — SIGNIFICANT CHANGE UP (ref 10.3–14.5)
SODIUM SERPL-SCNC: 124 MMOL/L — LOW (ref 135–145)
SODIUM SERPL-SCNC: 128 MMOL/L — LOW (ref 135–145)
SODIUM UR-SCNC: 56 MMOL/L — SIGNIFICANT CHANGE UP
TSH SERPL-MCNC: 3.45 UIU/ML — SIGNIFICANT CHANGE UP (ref 0.27–4.2)
WBC # BLD: 7.43 K/UL — SIGNIFICANT CHANGE UP (ref 3.8–10.5)
WBC # FLD AUTO: 7.43 K/UL — SIGNIFICANT CHANGE UP (ref 3.8–10.5)

## 2023-04-12 PROCEDURE — 31575 DIAGNOSTIC LARYNGOSCOPY: CPT

## 2023-04-12 PROCEDURE — 99222 1ST HOSP IP/OBS MODERATE 55: CPT | Mod: GC

## 2023-04-12 RX ORDER — LATANOPROST 0.05 MG/ML
1 SOLUTION/ DROPS OPHTHALMIC; TOPICAL
Qty: 0 | Refills: 0 | DISCHARGE

## 2023-04-12 RX ORDER — SODIUM CHLORIDE 5 G/100ML
500 INJECTION, SOLUTION INTRAVENOUS
Refills: 0 | Status: DISCONTINUED | OUTPATIENT
Start: 2023-04-12 | End: 2023-04-16

## 2023-04-12 RX ORDER — PREGABALIN 225 MG/1
1 CAPSULE ORAL
Qty: 0 | Refills: 0 | DISCHARGE

## 2023-04-12 RX ORDER — LATANOPROST 0.05 MG/ML
1 SOLUTION/ DROPS OPHTHALMIC; TOPICAL AT BEDTIME
Refills: 0 | Status: DISCONTINUED | OUTPATIENT
Start: 2023-04-12 | End: 2023-04-16

## 2023-04-12 RX ORDER — SODIUM CHLORIDE 9 MG/ML
1000 INJECTION, SOLUTION INTRAVENOUS
Refills: 0 | Status: DISCONTINUED | OUTPATIENT
Start: 2023-04-12 | End: 2023-04-16

## 2023-04-12 RX ORDER — ATENOLOL 25 MG/1
50 TABLET ORAL DAILY
Refills: 0 | Status: DISCONTINUED | OUTPATIENT
Start: 2023-04-12 | End: 2023-04-12

## 2023-04-12 RX ORDER — ONDANSETRON 8 MG/1
4 TABLET, FILM COATED ORAL EVERY 8 HOURS
Refills: 0 | Status: DISCONTINUED | OUTPATIENT
Start: 2023-04-12 | End: 2023-04-16

## 2023-04-12 RX ORDER — ASCORBIC ACID 60 MG
500 TABLET,CHEWABLE ORAL DAILY
Refills: 0 | Status: DISCONTINUED | OUTPATIENT
Start: 2023-04-12 | End: 2023-04-16

## 2023-04-12 RX ORDER — DIAZEPAM 5 MG
5 TABLET ORAL AT BEDTIME
Refills: 0 | Status: DISCONTINUED | OUTPATIENT
Start: 2023-04-12 | End: 2023-04-16

## 2023-04-12 RX ORDER — ASCORBIC ACID 60 MG
1 TABLET,CHEWABLE ORAL
Qty: 0 | Refills: 0 | DISCHARGE

## 2023-04-12 RX ORDER — SENNA PLUS 8.6 MG/1
2 TABLET ORAL AT BEDTIME
Refills: 0 | Status: DISCONTINUED | OUTPATIENT
Start: 2023-04-12 | End: 2023-04-16

## 2023-04-12 RX ORDER — INSULIN LISPRO 100/ML
VIAL (ML) SUBCUTANEOUS
Refills: 0 | Status: DISCONTINUED | OUTPATIENT
Start: 2023-04-12 | End: 2023-04-16

## 2023-04-12 RX ORDER — LEVOTHYROXINE SODIUM 125 MCG
1 TABLET ORAL
Refills: 0 | DISCHARGE

## 2023-04-12 RX ORDER — DEXTROSE 50 % IN WATER 50 %
15 SYRINGE (ML) INTRAVENOUS ONCE
Refills: 0 | Status: DISCONTINUED | OUTPATIENT
Start: 2023-04-12 | End: 2023-04-16

## 2023-04-12 RX ORDER — ATENOLOL 25 MG/1
1 TABLET ORAL
Qty: 0 | Refills: 0 | DISCHARGE

## 2023-04-12 RX ORDER — CARVEDILOL PHOSPHATE 80 MG/1
1 CAPSULE, EXTENDED RELEASE ORAL
Refills: 0 | DISCHARGE

## 2023-04-12 RX ORDER — PREGABALIN 225 MG/1
1000 CAPSULE ORAL DAILY
Refills: 0 | Status: DISCONTINUED | OUTPATIENT
Start: 2023-04-12 | End: 2023-04-16

## 2023-04-12 RX ORDER — ATORVASTATIN CALCIUM 80 MG/1
40 TABLET, FILM COATED ORAL AT BEDTIME
Refills: 0 | Status: DISCONTINUED | OUTPATIENT
Start: 2023-04-12 | End: 2023-04-16

## 2023-04-12 RX ORDER — ACETAMINOPHEN 500 MG
650 TABLET ORAL EVERY 6 HOURS
Refills: 0 | Status: DISCONTINUED | OUTPATIENT
Start: 2023-04-12 | End: 2023-04-16

## 2023-04-12 RX ORDER — METFORMIN HYDROCHLORIDE 850 MG/1
1 TABLET ORAL
Qty: 0 | Refills: 0 | DISCHARGE

## 2023-04-12 RX ORDER — PANTOPRAZOLE SODIUM 20 MG/1
1 TABLET, DELAYED RELEASE ORAL
Refills: 0 | DISCHARGE

## 2023-04-12 RX ORDER — ASPIRIN/CALCIUM CARB/MAGNESIUM 324 MG
1 TABLET ORAL
Qty: 0 | Refills: 0 | DISCHARGE

## 2023-04-12 RX ORDER — GLIMEPIRIDE 1 MG
1 TABLET ORAL
Qty: 0 | Refills: 0 | DISCHARGE

## 2023-04-12 RX ORDER — AMLODIPINE BESYLATE 2.5 MG/1
10 TABLET ORAL DAILY
Refills: 0 | Status: DISCONTINUED | OUTPATIENT
Start: 2023-04-12 | End: 2023-04-15

## 2023-04-12 RX ORDER — HEPARIN SODIUM 5000 [USP'U]/ML
5000 INJECTION INTRAVENOUS; SUBCUTANEOUS EVERY 12 HOURS
Refills: 0 | Status: DISCONTINUED | OUTPATIENT
Start: 2023-04-12 | End: 2023-04-16

## 2023-04-12 RX ORDER — PANTOPRAZOLE SODIUM 20 MG/1
40 TABLET, DELAYED RELEASE ORAL
Refills: 0 | Status: DISCONTINUED | OUTPATIENT
Start: 2023-04-12 | End: 2023-04-16

## 2023-04-12 RX ORDER — DEXTROSE 50 % IN WATER 50 %
25 SYRINGE (ML) INTRAVENOUS ONCE
Refills: 0 | Status: DISCONTINUED | OUTPATIENT
Start: 2023-04-12 | End: 2023-04-16

## 2023-04-12 RX ORDER — INSULIN LISPRO 100/ML
VIAL (ML) SUBCUTANEOUS AT BEDTIME
Refills: 0 | Status: DISCONTINUED | OUTPATIENT
Start: 2023-04-12 | End: 2023-04-16

## 2023-04-12 RX ORDER — ATORVASTATIN CALCIUM 80 MG/1
1 TABLET, FILM COATED ORAL
Qty: 0 | Refills: 0 | DISCHARGE

## 2023-04-12 RX ORDER — CARVEDILOL PHOSPHATE 80 MG/1
12.5 CAPSULE, EXTENDED RELEASE ORAL EVERY 12 HOURS
Refills: 0 | Status: DISCONTINUED | OUTPATIENT
Start: 2023-04-12 | End: 2023-04-16

## 2023-04-12 RX ORDER — LOSARTAN POTASSIUM 100 MG/1
100 TABLET, FILM COATED ORAL DAILY
Refills: 0 | Status: DISCONTINUED | OUTPATIENT
Start: 2023-04-12 | End: 2023-04-16

## 2023-04-12 RX ORDER — LANOLIN ALCOHOL/MO/W.PET/CERES
3 CREAM (GRAM) TOPICAL AT BEDTIME
Refills: 0 | Status: DISCONTINUED | OUTPATIENT
Start: 2023-04-12 | End: 2023-04-16

## 2023-04-12 RX ORDER — DEXTROSE 50 % IN WATER 50 %
12.5 SYRINGE (ML) INTRAVENOUS ONCE
Refills: 0 | Status: DISCONTINUED | OUTPATIENT
Start: 2023-04-12 | End: 2023-04-16

## 2023-04-12 RX ORDER — LEVOTHYROXINE SODIUM 125 MCG
100 TABLET ORAL DAILY
Refills: 0 | Status: DISCONTINUED | OUTPATIENT
Start: 2023-04-12 | End: 2023-04-16

## 2023-04-12 RX ORDER — GLUCAGON INJECTION, SOLUTION 0.5 MG/.1ML
1 INJECTION, SOLUTION SUBCUTANEOUS ONCE
Refills: 0 | Status: DISCONTINUED | OUTPATIENT
Start: 2023-04-12 | End: 2023-04-16

## 2023-04-12 RX ORDER — ASPIRIN/CALCIUM CARB/MAGNESIUM 324 MG
81 TABLET ORAL DAILY
Refills: 0 | Status: DISCONTINUED | OUTPATIENT
Start: 2023-04-12 | End: 2023-04-16

## 2023-04-12 RX ORDER — LOSARTAN POTASSIUM 100 MG/1
1 TABLET, FILM COATED ORAL
Refills: 0 | DISCHARGE

## 2023-04-12 RX ADMIN — Medication 2: at 17:47

## 2023-04-12 RX ADMIN — LATANOPROST 1 DROP(S): 0.05 SOLUTION/ DROPS OPHTHALMIC; TOPICAL at 03:35

## 2023-04-12 RX ADMIN — Medication 1 DROP(S): at 06:16

## 2023-04-12 RX ADMIN — Medication 3: at 13:18

## 2023-04-12 RX ADMIN — Medication 1 DROP(S): at 21:34

## 2023-04-12 RX ADMIN — Medication 25 MILLIGRAM(S): at 06:14

## 2023-04-12 RX ADMIN — CARVEDILOL PHOSPHATE 12.5 MILLIGRAM(S): 80 CAPSULE, EXTENDED RELEASE ORAL at 17:49

## 2023-04-12 RX ADMIN — PREGABALIN 1000 MICROGRAM(S): 225 CAPSULE ORAL at 14:11

## 2023-04-12 RX ADMIN — CARVEDILOL PHOSPHATE 12.5 MILLIGRAM(S): 80 CAPSULE, EXTENDED RELEASE ORAL at 06:14

## 2023-04-12 RX ADMIN — HEPARIN SODIUM 5000 UNIT(S): 5000 INJECTION INTRAVENOUS; SUBCUTANEOUS at 06:14

## 2023-04-12 RX ADMIN — AMLODIPINE BESYLATE 10 MILLIGRAM(S): 2.5 TABLET ORAL at 06:15

## 2023-04-12 RX ADMIN — Medication 100 MICROGRAM(S): at 06:15

## 2023-04-12 RX ADMIN — Medication 25 MILLIGRAM(S): at 13:22

## 2023-04-12 RX ADMIN — Medication 25 MILLIGRAM(S): at 21:34

## 2023-04-12 RX ADMIN — Medication 81 MILLIGRAM(S): at 13:22

## 2023-04-12 RX ADMIN — Medication 500 MILLIGRAM(S): at 13:20

## 2023-04-12 RX ADMIN — ATORVASTATIN CALCIUM 40 MILLIGRAM(S): 80 TABLET, FILM COATED ORAL at 21:35

## 2023-04-12 RX ADMIN — LATANOPROST 1 DROP(S): 0.05 SOLUTION/ DROPS OPHTHALMIC; TOPICAL at 21:35

## 2023-04-12 RX ADMIN — SODIUM CHLORIDE 50 MILLILITER(S): 5 INJECTION, SOLUTION INTRAVENOUS at 22:23

## 2023-04-12 RX ADMIN — Medication 1 DROP(S): at 13:22

## 2023-04-12 RX ADMIN — LOSARTAN POTASSIUM 100 MILLIGRAM(S): 100 TABLET, FILM COATED ORAL at 06:14

## 2023-04-12 RX ADMIN — Medication 1: at 21:34

## 2023-04-12 RX ADMIN — SENNA PLUS 2 TABLET(S): 8.6 TABLET ORAL at 21:34

## 2023-04-12 RX ADMIN — PANTOPRAZOLE SODIUM 40 MILLIGRAM(S): 20 TABLET, DELAYED RELEASE ORAL at 06:16

## 2023-04-12 RX ADMIN — HEPARIN SODIUM 5000 UNIT(S): 5000 INJECTION INTRAVENOUS; SUBCUTANEOUS at 17:49

## 2023-04-12 NOTE — CONSULT NOTE ADULT - PROBLEM SELECTOR RECOMMENDATION 3
- Follow up outpatient with Dr. Lee/Nikki/Corey/Emerson [phone] for formal audiogram within 1 week  - call with questions or concerns x [phone]

## 2023-04-12 NOTE — CONSULT NOTE ADULT - ASSESSMENT
78yo female admitted for dizziness (room spinning)  x 3 weeks. Pt describes her dizziness as intermittent room spinning that occurs during movements. Pt states improvement of symptoms with meclizine and valium regimen since admission. Pt states she had right sided posterior HA and right ear infection associated with hearing loss x 1 month but did not complete abx. She c/o persistent ear pain, however ear exam unremarkable. No evidence of infection on exam. Pt also c/o throat pain, exam and indirect laryngoscopy unremarkable other than reflux.

## 2023-04-12 NOTE — CONSULT NOTE ADULT - NS ATTEND AMEND GEN_ALL_CORE FT
ENT consulted for dizziness and throat irrigation. Patient states it stated 3 weeks ago. Pt describes her dizziness as intermittent room spinning that occurs during movements. Pt states improvement of symptoms with meclizine and valium regimen since admission.     Pt states she had right sided posterior HA and right ear infection associated with hearing loss x 1 month but did not complete abx. She c/o persistent ear pain, Pt also c/o throat irration     Physical exam shows No evidence of infection, wax, or fluid in ear     Indirect laryngoscopy shows  + pachydermia consistent with reflux. Airway patent, Vocal cords mobile with good contact b/l    Recommend  Dizziness  -Based on clinical history and physical exam ddx include vestibular neuronitis or labyrinthitis but given that symptoms of hearing loss occurred 1 month ago. Steroid treatment with provide little benefit. This was explain to patient and she understood reasoning.  - Given her symptoms improve with meclizine and valium can continue PRN as per primary but would advise to only use when symptoms are intolerable as the medication can inhibit brain from adjusting to its new normal  - Follow up outpatient with Dr. Lee/Nikki/Corey/Emerson [phone] for formal audiogram within 1 week    LPRD  - recommend PPI  - reflux precautions (avoid spicy foods and caffeine, sit upright for 3 hours after eating, etc)    - call with questions or concerns x [phone]

## 2023-04-12 NOTE — CHART NOTE - NSCHARTNOTEFT_GEN_A_CORE
HPI:  77y F pmh htn, dm, hld presents for c/o dizziness (room spinning)  x 3 weeks. Patient states that she saw her doctor few days ago and was placed on meclizine, has been taking med as directed  x4d w/minimal sx improvement. She also reports associated weakness, endorsed falling over past 2d, denies injury or head trauma.  She is unable to walk due to symptoms.  Of note she had  right side posterior HA & recent right ear infection  apx 1mo ago, s/p abx course        Denies CP, SOB, palpitation, N/V/D, fever, cough, chills, abm pain, recent travel, sick contact, change in bowel and urinary habits    (11 Apr 2023 23:44) HPI:  77y F pmh htn, dm, hld presents for c/o dizziness (room spinning)  x 3 weeks. Patient states that she saw her doctor few days ago and was placed on meclizine, has been taking med as directed  x4d w/minimal sx improvement. She also reports associated weakness, endorsed falling over past 2d, denies injury or head trauma.    Patient hyponatremic.  Sodium, Serum: 124 mmol/L (04.12.23 @ 18:24)  Comprehensive Metabolic Panel (04.12.23 @ 07:18)  Sodium, Serum: 128 mmol/L  Case discussed with yuval Champion to administer Normal Saline 1.5% 50ml/hr, x 6hr and repeat BMP in the am   Will c/w fluid restriction.     Will follow up with attending and day ACP.     Emelin Reyes [place], [facility]  Medicine Department   Spectralink [phone]

## 2023-04-12 NOTE — CONSULT NOTE ADULT - SUBJECTIVE AND OBJECTIVE BOX
CC: dizziness    HPI: 77y F pmh htn, dm, hld admitted for dizziness (room spinning)  x 3 weeks. Patient states that she saw her doctor few days ago and was placed on meclizine, has been taking med as directed  x4d w/minimal sx improvement. She also reports associated weakness and falling over the past 2 day. She denies injury or head trauma.  She is unable to walk due to symptoms. Pt states she had right side posterior HA & recent right ear infection about 1mo ago, s/p abx course. Pt denies fever, chills, n/v, tinnitus, hearing loss, HA, SOB, dysphagia, odynophagia, hemoptysis, hoarseness, unintentional weight loss.       PAST MEDICAL & SURGICAL HISTORY:  DM (diabetes mellitus)      HTN (hypertension)      HLD (hyperlipidemia)      Hypothyroid      GERD (gastroesophageal reflux disease)      Glaucoma      Hypertension      Diabetes mellitus      Hypothyroidism      Status post cholecystectomy      Subluxed cataract of both eyes      No significant past surgical history        Allergies    eggs (Other)  eggs (Stomach Upset)  No Known Drug Allergies    Intolerances      MEDICATIONS  (STANDING):  amLODIPine   Tablet 10 milliGRAM(s) Oral daily  artificial tears (preservative free) Ophthalmic Solution 1 Drop(s) Both EYES three times a day  ascorbic acid 500 milliGRAM(s) Oral daily  aspirin enteric coated 81 milliGRAM(s) Oral daily  atorvastatin 40 milliGRAM(s) Oral at bedtime  carvedilol 12.5 milliGRAM(s) Oral every 12 hours  cyanocobalamin 1000 MICROGram(s) Oral daily  dextrose 5%. 1000 milliLiter(s) (100 mL/Hr) IV Continuous <Continuous>  dextrose 5%. 1000 milliLiter(s) (50 mL/Hr) IV Continuous <Continuous>  dextrose 50% Injectable 25 Gram(s) IV Push once  dextrose 50% Injectable 12.5 Gram(s) IV Push once  dextrose 50% Injectable 25 Gram(s) IV Push once  glucagon  Injectable 1 milliGRAM(s) IntraMuscular once  heparin   Injectable 5000 Unit(s) SubCutaneous every 12 hours  insulin lispro (ADMELOG) corrective regimen sliding scale   SubCutaneous three times a day before meals  insulin lispro (ADMELOG) corrective regimen sliding scale   SubCutaneous at bedtime  latanoprost 0.005% Ophthalmic Solution 1 Drop(s) Both EYES at bedtime  levothyroxine 100 MICROGram(s) Oral daily  losartan 100 milliGRAM(s) Oral daily  meclizine 25 milliGRAM(s) Oral every 8 hours  pantoprazole    Tablet 40 milliGRAM(s) Oral before breakfast  senna 2 Tablet(s) Oral at bedtime    MEDICATIONS  (PRN):  acetaminophen     Tablet .. 650 milliGRAM(s) Oral every 6 hours PRN Temp greater or equal to 38C (100.4F), Mild Pain (1 - 3)  aluminum hydroxide/magnesium hydroxide/simethicone Suspension 30 milliLiter(s) Oral every 4 hours PRN Dyspepsia  dextrose Oral Gel 15 Gram(s) Oral once PRN Blood Glucose LESS THAN 70 milliGRAM(s)/deciliter  diazepam    Tablet 5 milliGRAM(s) Oral at bedtime PRN Dizziness  melatonin 3 milliGRAM(s) Oral at bedtime PRN Insomnia  ondansetron Injectable 4 milliGRAM(s) IV Push every 8 hours PRN Nausea and/or Vomiting      Social History: **??**    Family history: **??**    ROS:   ENT: all negative except as noted in HPI   CV: denies palpitations  Pulm: denies SOB, cough, hemoptysis  GI: denies change in apetite, indigestion, n/v  : denies pertinent urinary symptoms, urgency  Neuro: denies numbness/tingling, loss of sensation  Psych: denies anxiety  MS: denies muscle weakness, instability  Heme: denies easy bruising or bleeding  Endo: denies heat/cold intolerance, excessive sweating  Vascular: denies LE edema    Vital Signs Last 24 Hrs  T(C): 36.9 (12 Apr 2023 09:39), Max: 36.9 (11 Apr 2023 15:02)  T(F): 98.4 (12 Apr 2023 09:39), Max: 98.4 (11 Apr 2023 15:02)  HR: 74 (12 Apr 2023 09:39) (61 - 77)  BP: 109/70 (12 Apr 2023 09:39) (107/61 - 152/69)  BP(mean): --  RR: 18 (12 Apr 2023 09:39) (14 - 18)  SpO2: 99% (12 Apr 2023 09:39) (99% - 100%)    Parameters below as of 12 Apr 2023 09:39  Patient On (Oxygen Delivery Method): room air                              8.8    7.43  )-----------( 344      ( 12 Apr 2023 07:18 )             26.4    04-12    128<L>  |  94<L>  |  15  ----------------------------<  101<H>  4.5   |  21<L>  |  1.13    Ca    9.6      12 Apr 2023 07:18  Mg     1.6     04-11    TPro  7.3  /  Alb  3.9  /  TBili  0.4  /  DBili  x   /  AST  12  /  ALT  12  /  AlkPhos  67  04-12   PT/INR - ( 12 Apr 2023 07:18 )   PT: 13.1 sec;   INR: 1.14 ratio             PHYSICAL EXAM:  Gen: NAD  Skin: No rashes, bruises, or lesions  Head: Normocephalic, Atraumatic  Face: no edema, erythema, or fluctuance. Parotid glands soft without mass  Eyes: no scleral injection  Ears: Right: ear canal clear, TM intact without effusion or erythema. No evidence of any fluid drainage. No mastoid tenderness, erythema, or ear bulging            Left: ear canal clear, TM intact without effusion or erythema. No evidence of any fluid drainage. No mastoid tenderness, erythema, or ear bulging  Nose: Nares bilaterally patent, no discharge  Mouth: No Stridor / Drooling / Trismus.  Mucosa moist, tongue/uvula midline, oropharynx clear  Neck: Flat, supple, no lymphadenopathy, trachea midline, no masses  Lymphatic: No lymphadenopathy  Resp: breathing easily, no stridor  CV: no peripheral edema/cyanosis  GI: nondistended   Peripheral vascular: no JVD or edema  Neuro: facial nerve intact, no facial droop      Diagnostic Nasal Endoscopy: (Scope #2 used)    Fiberoptic Indirect laryngoscopy:  (Scope #2 used)    IMAGING/ADDITIONAL STUDIES:  No complaints  - declines TDAP CC: dizziness    HPI: 76yo female with PMHx DM, HTN, HLD admitted for dizziness (room spinning)  x 3 weeks. Pt describes her dizziness as intermittent room spinning that occurs during movements, such as sitting to standing, and resolves after prolong rest. She tried meclizine 25mg for the past few days prescribed by pcp with minimal symptom relief. She was also given valium inpatient with minimal relief. Pt states she fell twice during episodes but does not feel unbalanced when not symptomatic. Pt states she had right sided posterior HA  and right ear infection in s/p abx 1 month ago. Pt denies fever, chills, n/v, tinnitus, hearing loss, HA, SOB, dysphagia, odynophagia, hemoptysis, hoarseness, unintentional weight loss.       PAST MEDICAL & SURGICAL HISTORY:  DM (diabetes mellitus)      HTN (hypertension)      HLD (hyperlipidemia)      Hypothyroid      GERD (gastroesophageal reflux disease)      Glaucoma      Hypertension      Diabetes mellitus      Hypothyroidism      Status post cholecystectomy      Subluxed cataract of both eyes      No significant past surgical history        Allergies    eggs (Other)  eggs (Stomach Upset)  No Known Drug Allergies    Intolerances      MEDICATIONS  (STANDING):  amLODIPine   Tablet 10 milliGRAM(s) Oral daily  artificial tears (preservative free) Ophthalmic Solution 1 Drop(s) Both EYES three times a day  ascorbic acid 500 milliGRAM(s) Oral daily  aspirin enteric coated 81 milliGRAM(s) Oral daily  atorvastatin 40 milliGRAM(s) Oral at bedtime  carvedilol 12.5 milliGRAM(s) Oral every 12 hours  cyanocobalamin 1000 MICROGram(s) Oral daily  dextrose 5%. 1000 milliLiter(s) (100 mL/Hr) IV Continuous <Continuous>  dextrose 5%. 1000 milliLiter(s) (50 mL/Hr) IV Continuous <Continuous>  dextrose 50% Injectable 25 Gram(s) IV Push once  dextrose 50% Injectable 12.5 Gram(s) IV Push once  dextrose 50% Injectable 25 Gram(s) IV Push once  glucagon  Injectable 1 milliGRAM(s) IntraMuscular once  heparin   Injectable 5000 Unit(s) SubCutaneous every 12 hours  insulin lispro (ADMELOG) corrective regimen sliding scale   SubCutaneous three times a day before meals  insulin lispro (ADMELOG) corrective regimen sliding scale   SubCutaneous at bedtime  latanoprost 0.005% Ophthalmic Solution 1 Drop(s) Both EYES at bedtime  levothyroxine 100 MICROGram(s) Oral daily  losartan 100 milliGRAM(s) Oral daily  meclizine 25 milliGRAM(s) Oral every 8 hours  pantoprazole    Tablet 40 milliGRAM(s) Oral before breakfast  senna 2 Tablet(s) Oral at bedtime    MEDICATIONS  (PRN):  acetaminophen     Tablet .. 650 milliGRAM(s) Oral every 6 hours PRN Temp greater or equal to 38C (100.4F), Mild Pain (1 - 3)  aluminum hydroxide/magnesium hydroxide/simethicone Suspension 30 milliLiter(s) Oral every 4 hours PRN Dyspepsia  dextrose Oral Gel 15 Gram(s) Oral once PRN Blood Glucose LESS THAN 70 milliGRAM(s)/deciliter  diazepam    Tablet 5 milliGRAM(s) Oral at bedtime PRN Dizziness  melatonin 3 milliGRAM(s) Oral at bedtime PRN Insomnia  ondansetron Injectable 4 milliGRAM(s) IV Push every 8 hours PRN Nausea and/or Vomiting      Social History: **??**    Family history: **??**    ROS:   ENT: all negative except as noted in HPI   CV: denies palpitations  Pulm: denies SOB, cough, hemoptysis  GI: denies change in apetite, indigestion, n/v  : denies pertinent urinary symptoms, urgency  Neuro: denies numbness/tingling, loss of sensation  Psych: denies anxiety  MS: denies muscle weakness, instability  Heme: denies easy bruising or bleeding  Endo: denies heat/cold intolerance, excessive sweating  Vascular: denies LE edema    Vital Signs Last 24 Hrs  T(C): 36.9 (12 Apr 2023 09:39), Max: 36.9 (11 Apr 2023 15:02)  T(F): 98.4 (12 Apr 2023 09:39), Max: 98.4 (11 Apr 2023 15:02)  HR: 74 (12 Apr 2023 09:39) (61 - 77)  BP: 109/70 (12 Apr 2023 09:39) (107/61 - 152/69)  BP(mean): --  RR: 18 (12 Apr 2023 09:39) (14 - 18)  SpO2: 99% (12 Apr 2023 09:39) (99% - 100%)    Parameters below as of 12 Apr 2023 09:39  Patient On (Oxygen Delivery Method): room air                              8.8    7.43  )-----------( 344      ( 12 Apr 2023 07:18 )             26.4    04-12    128<L>  |  94<L>  |  15  ----------------------------<  101<H>  4.5   |  21<L>  |  1.13    Ca    9.6      12 Apr 2023 07:18  Mg     1.6     04-11    TPro  7.3  /  Alb  3.9  /  TBili  0.4  /  DBili  x   /  AST  12  /  ALT  12  /  AlkPhos  67  04-12   PT/INR - ( 12 Apr 2023 07:18 )   PT: 13.1 sec;   INR: 1.14 ratio             PHYSICAL EXAM:  Gen: NAD  Skin: No rashes, bruises, or lesions  Head: Normocephalic, Atraumatic  Face: no edema, erythema, or fluctuance. Parotid glands soft without mass  Eyes: no scleral injection  Ears: Right: ear canal clear, TM intact without effusion or erythema. No evidence of any fluid drainage. No mastoid tenderness, erythema, or ear bulging            Left: ear canal clear, TM intact without effusion or erythema. No evidence of any fluid drainage. No mastoid tenderness, erythema, or ear bulging  Nose: Nares bilaterally patent, no discharge  Mouth: No Stridor / Drooling / Trismus.  Mucosa moist, tongue/uvula midline, oropharynx clear  Neck: Flat, supple, no lymphadenopathy, trachea midline, no masses  Lymphatic: No lymphadenopathy  Resp: breathing easily, no stridor  CV: no peripheral edema/cyanosis  GI: nondistended   Peripheral vascular: no JVD or edema  Neuro: facial nerve intact, no facial droop      Diagnostic Nasal Endoscopy: (Scope #2 used)    Fiberoptic Indirect laryngoscopy:  (Scope #2 used)    IMAGING/ADDITIONAL STUDIES:  CC: dizziness    HPI: 78yo female with PMHx DM, HTN, HLD admitted for dizziness (room spinning)  x 3 weeks. Pt describes her dizziness as intermittent room spinning that occurs during movements, such as sitting to standing, and resolves after prolong rest. Pt states she fell twice during episodes but does not feel unbalanced when not symptomatic. Pt states improvement of symptoms with meclizine and valium regimen. Pt states she had right sided posterior HA and right ear infection associated with hearing loss x 1 month but did not complete abx. Pt denies fever, chills, n/v, tinnitus, HA, SOB, dysphagia, odynophagia, hemoptysis, hoarseness, unintentional weight loss.       PAST MEDICAL & SURGICAL HISTORY:  DM (diabetes mellitus)      HTN (hypertension)      HLD (hyperlipidemia)      Hypothyroid      GERD (gastroesophageal reflux disease)      Glaucoma      Hypertension      Diabetes mellitus      Hypothyroidism      Status post cholecystectomy      Subluxed cataract of both eyes      No significant past surgical history        Allergies    eggs (Other)  eggs (Stomach Upset)  No Known Drug Allergies    Intolerances      MEDICATIONS  (STANDING):  amLODIPine   Tablet 10 milliGRAM(s) Oral daily  artificial tears (preservative free) Ophthalmic Solution 1 Drop(s) Both EYES three times a day  ascorbic acid 500 milliGRAM(s) Oral daily  aspirin enteric coated 81 milliGRAM(s) Oral daily  atorvastatin 40 milliGRAM(s) Oral at bedtime  carvedilol 12.5 milliGRAM(s) Oral every 12 hours  cyanocobalamin 1000 MICROGram(s) Oral daily  dextrose 5%. 1000 milliLiter(s) (100 mL/Hr) IV Continuous <Continuous>  dextrose 5%. 1000 milliLiter(s) (50 mL/Hr) IV Continuous <Continuous>  dextrose 50% Injectable 25 Gram(s) IV Push once  dextrose 50% Injectable 12.5 Gram(s) IV Push once  dextrose 50% Injectable 25 Gram(s) IV Push once  glucagon  Injectable 1 milliGRAM(s) IntraMuscular once  heparin   Injectable 5000 Unit(s) SubCutaneous every 12 hours  insulin lispro (ADMELOG) corrective regimen sliding scale   SubCutaneous three times a day before meals  insulin lispro (ADMELOG) corrective regimen sliding scale   SubCutaneous at bedtime  latanoprost 0.005% Ophthalmic Solution 1 Drop(s) Both EYES at bedtime  levothyroxine 100 MICROGram(s) Oral daily  losartan 100 milliGRAM(s) Oral daily  meclizine 25 milliGRAM(s) Oral every 8 hours  pantoprazole    Tablet 40 milliGRAM(s) Oral before breakfast  senna 2 Tablet(s) Oral at bedtime    MEDICATIONS  (PRN):  acetaminophen     Tablet .. 650 milliGRAM(s) Oral every 6 hours PRN Temp greater or equal to 38C (100.4F), Mild Pain (1 - 3)  aluminum hydroxide/magnesium hydroxide/simethicone Suspension 30 milliLiter(s) Oral every 4 hours PRN Dyspepsia  dextrose Oral Gel 15 Gram(s) Oral once PRN Blood Glucose LESS THAN 70 milliGRAM(s)/deciliter  diazepam    Tablet 5 milliGRAM(s) Oral at bedtime PRN Dizziness  melatonin 3 milliGRAM(s) Oral at bedtime PRN Insomnia  ondansetron Injectable 4 milliGRAM(s) IV Push every 8 hours PRN Nausea and/or Vomiting      Social History: Pt denies tobacco use     Family history: Pt denies any significant family history     ROS:   ENT: all negative except as noted in HPI   CV: denies palpitations  Pulm: denies SOB, cough, hemoptysis  GI: denies change in apetite, indigestion, n/v  : denies pertinent urinary symptoms, urgency  Neuro: denies numbness/tingling, loss of sensation  Psych: denies anxiety  MS: denies muscle weakness, instability  Heme: denies easy bruising or bleeding  Endo: denies heat/cold intolerance, excessive sweating  Vascular: denies LE edema    Vital Signs Last 24 Hrs  T(C): 36.9 (12 Apr 2023 09:39), Max: 36.9 (11 Apr 2023 15:02)  T(F): 98.4 (12 Apr 2023 09:39), Max: 98.4 (11 Apr 2023 15:02)  HR: 74 (12 Apr 2023 09:39) (61 - 77)  BP: 109/70 (12 Apr 2023 09:39) (107/61 - 152/69)  BP(mean): --  RR: 18 (12 Apr 2023 09:39) (14 - 18)  SpO2: 99% (12 Apr 2023 09:39) (99% - 100%)    Parameters below as of 12 Apr 2023 09:39  Patient On (Oxygen Delivery Method): room air                              8.8    7.43  )-----------( 344      ( 12 Apr 2023 07:18 )             26.4    04-12    128<L>  |  94<L>  |  15  ----------------------------<  101<H>  4.5   |  21<L>  |  1.13    Ca    9.6      12 Apr 2023 07:18  Mg     1.6     04-11    TPro  7.3  /  Alb  3.9  /  TBili  0.4  /  DBili  x   /  AST  12  /  ALT  12  /  AlkPhos  67  04-12   PT/INR - ( 12 Apr 2023 07:18 )   PT: 13.1 sec;   INR: 1.14 ratio             PHYSICAL EXAM:  Gen: NAD  Skin: No rashes, bruises, or lesions  Head: Normocephalic, Atraumatic  Face: no edema, erythema, or fluctuance. Parotid glands soft without mass  Eyes: no scleral injection  Ears: Negative wade-hallpike           Right: ear canal clear, TM intact without effusion or erythema. No evidence of any fluid drainage. No mastoid tenderness, erythema, or ear bulging            Left: ear canal clear, TM intact without effusion or erythema. No evidence of any fluid drainage. No mastoid tenderness, erythema, or ear bulging  Nose: Nares bilaterally patent, no discharge  Mouth: No Stridor / Drooling / Trismus.  Mucosa moist, tongue/uvula midline, oropharynx clear  Neck: Flat, supple, no lymphadenopathy, trachea midline, no masses  Lymphatic: No lymphadenopathy  Resp: breathing easily, no stridor  CV: no peripheral edema/cyanosis  GI: nondistended   Peripheral vascular: no JVD or edema  Neuro: facial nerve intact, no facial droop        Fiberoptic Indirect Laryngoscopy (Ambu scope used):    Reason for Laryngoscopy: throat pain    Patient was unable to cooperate with mirror.  Nasopharynx, oropharynx, and hypopharynx clear, no bleeding. Tongue base, posterior pharyngeal wall, vallecula, epiglottis, and subglottis appear normal. + pachydermia consistent with reflux. No erythema, edema, pooling of secretions, masses or lesions. Airway patent, no foreign body visualized. No glottic/supraglottic edema. True vocal cords, arytenoids, vestibular folds, ventricles, pyriform sinuses, and aryepiglottic folds appear normal bilaterally. Vocal cords mobile with good contact b/l.

## 2023-04-12 NOTE — CONSULT NOTE ADULT - PROBLEM SELECTOR RECOMMENDATION 9
- no evidence of true vertigo on exam, however pt states improvement of symptoms with meclizine and valium  - continue meclizine and valium  - f/u outpatient for further evaluation or worsening of symptoms  - Follow up outpatient with Dr. Lee/Nikki/Corey/Emerson [phone] -Based on clinical history and physical exam ddx include vestibular neuronitis or labyrinthitis but given that symptoms of hearing loss occurred 1 month ago. Steroid treatment with provide little benefit. This was explain to patient and she understood reasoning.  - Given her symptoms improve with meclizine and valium can continue PRN as per primary but would advise to only use when symptoms are intolerable as the medication can inhibit brain from adjusting to its new normal    - Follow up outpatient with Dr. Lee/Nikki/Corey/Emerson [phone] for formal audiogram within 1 week

## 2023-04-12 NOTE — PHYSICAL THERAPY INITIAL EVALUATION ADULT - ACTIVE RANGE OF MOTION EXAMINATION, REHAB EVAL
miroslava. upper extremity Active ROM was WNL (within normal limits)/bilateral lower extremity Active ROM was WNL (within normal limits)

## 2023-04-12 NOTE — PROGRESS NOTE ADULT - SUBJECTIVE AND OBJECTIVE BOX
Patient is a 77y old  Female who presents with a chief complaint of dizziness (2023 13:30)      SUBJECTIVE / OVERNIGHT EVENTS:    Events noted.  CONSTITUTIONAL: Dizziness  RESPIRATORY: No cough, wheezing,  No shortness of breath  CARDIOVASCULAR: No chest pain, palpitations, dizziness, or leg swelling  GASTROINTESTINAL: No abdominal or epigastric pain. No nausea, vomiting.  NEUROLOGICAL: No headache    MEDICATIONS  (STANDING):  amLODIPine   Tablet 10 milliGRAM(s) Oral daily  artificial tears (preservative free) Ophthalmic Solution 1 Drop(s) Both EYES three times a day  ascorbic acid 500 milliGRAM(s) Oral daily  aspirin enteric coated 81 milliGRAM(s) Oral daily  atorvastatin 40 milliGRAM(s) Oral at bedtime  carvedilol 12.5 milliGRAM(s) Oral every 12 hours  cyanocobalamin 1000 MICROGram(s) Oral daily  dextrose 5%. 1000 milliLiter(s) (100 mL/Hr) IV Continuous <Continuous>  dextrose 5%. 1000 milliLiter(s) (50 mL/Hr) IV Continuous <Continuous>  dextrose 50% Injectable 25 Gram(s) IV Push once  dextrose 50% Injectable 12.5 Gram(s) IV Push once  dextrose 50% Injectable 25 Gram(s) IV Push once  glucagon  Injectable 1 milliGRAM(s) IntraMuscular once  heparin   Injectable 5000 Unit(s) SubCutaneous every 12 hours  insulin lispro (ADMELOG) corrective regimen sliding scale   SubCutaneous three times a day before meals  insulin lispro (ADMELOG) corrective regimen sliding scale   SubCutaneous at bedtime  latanoprost 0.005% Ophthalmic Solution 1 Drop(s) Both EYES at bedtime  levothyroxine 100 MICROGram(s) Oral daily  losartan 100 milliGRAM(s) Oral daily  meclizine 25 milliGRAM(s) Oral every 8 hours  pantoprazole    Tablet 40 milliGRAM(s) Oral before breakfast  senna 2 Tablet(s) Oral at bedtime  sodium chloride 1.5%. 500 milliLiter(s) (50 mL/Hr) IV Continuous <Continuous>    MEDICATIONS  (PRN):  acetaminophen     Tablet .. 650 milliGRAM(s) Oral every 6 hours PRN Temp greater or equal to 38C (100.4F), Mild Pain (1 - 3)  aluminum hydroxide/magnesium hydroxide/simethicone Suspension 30 milliLiter(s) Oral every 4 hours PRN Dyspepsia  dextrose Oral Gel 15 Gram(s) Oral once PRN Blood Glucose LESS THAN 70 milliGRAM(s)/deciliter  diazepam    Tablet 5 milliGRAM(s) Oral at bedtime PRN Dizziness  melatonin 3 milliGRAM(s) Oral at bedtime PRN Insomnia  ondansetron Injectable 4 milliGRAM(s) IV Push every 8 hours PRN Nausea and/or Vomiting        CAPILLARY BLOOD GLUCOSE      POCT Blood Glucose.: 259 mg/dL (2023 21:08)  POCT Blood Glucose.: 213 mg/dL (2023 17:01)  POCT Blood Glucose.: 257 mg/dL (2023 12:55)  POCT Blood Glucose.: 144 mg/dL (2023 08:55)    I&O's Summary    2023 07:  -  2023 07:00  --------------------------------------------------------  IN: 240 mL / OUT: 0 mL / NET: 240 mL    2023 07:01  -  2023 22:46  --------------------------------------------------------  IN: 720 mL / OUT: 0 mL / NET: 720 mL        T(C): 37.1 (23 @ 20:18), Max: 37.1 (23 @ 20:18)  HR: 76 (23 @ 20:18) (61 - 76)  BP: 110/64 (23 @ 20:18) (107/61 - 126/70)  RR: 20 (23 @ 20:18) (18 - 20)  SpO2: 99% (23 @ 20:18) (98% - 99%)    PHYSICAL EXAM:    NECK: Supple, No JVD  CHEST/LUNG: Clear to auscultation bilaterally; No wheezing.  HEART: Regular rate and rhythm; No murmurs, rubs, or gallops  ABDOMEN: Soft, Nontender, Nondistended; Bowel sounds present  EXTREMITIES:   No edema  NEUROLOGY: AAO X 3      LABS:                        8.8    7.43  )-----------( 344      ( 2023 07:18 )             26.4     04-12    124<L>  |  93<L>  |  21  ----------------------------<  204<H>  4.5   |  21<L>  |  1.26    Ca    9.3      2023 18:24  Mg     1.6     -11    TPro  7.3  /  Alb  3.9  /  TBili  0.4  /  DBili  x   /  AST  12  /  ALT  12  /  AlkPhos  67  04-12    PT/INR - ( 2023 07:18 )   PT: 13.1 sec;   INR: 1.14 ratio               Urinalysis Basic - ( 2023 19:19 )    Color: Colorless / Appearance: Clear / S.016 / pH: x  Gluc: x / Ketone: Negative  / Bili: Negative / Urobili: Negative   Blood: x / Protein: Negative / Nitrite: Negative   Leuk Esterase: Negative / RBC: x / WBC x   Sq Epi: x / Non Sq Epi: x / Bacteria: x      CAPILLARY BLOOD GLUCOSE      POCT Blood Glucose.: 259 mg/dL (2023 21:08)  POCT Blood Glucose.: 213 mg/dL (2023 17:01)  POCT Blood Glucose.: 257 mg/dL (2023 12:55)  POCT Blood Glucose.: 144 mg/dL (2023 08:55)        RADIOLOGY & ADDITIONAL TESTS:    Imaging Personally Reviewed:    Consultant(s) Notes Reviewed:      Care Discussed with Consultants/Other Providers:    Young Persaud MD, CMD, FACP    [id number] [id number]  Office Tel: [phone]  Cell: [phone]

## 2023-04-12 NOTE — CONSULT NOTE ADULT - SUBJECTIVE AND OBJECTIVE BOX
[facility] Consult Note Nephrology - CONSULTATION NOTE    77y F pmh htn, dm, hld presents for c/o dizziness (room spinning)  x 3 weeks. Patient states that she saw her doctor few days ago and was placed on meclizine, has been taking med as directed  x4d w/minimal sx improvement. She also reports associated weakness, endorsed falling over past 2d, denies injury or head trauma.  She is unable to walk due to symptoms.  Of note she had  right side posterior HA & recent right ear infection  apx 1mo ago, s/p abx course        Denies CP, SOB, palpitation, N/V/D, fever, cough, chills, abm pain, recent travel, sick contact, change in bowel and urinary habits       Consult for hyponatremia  na 121 now 128  was dizzy and was on chlorthalidone  currently feels better   denies any current nausea or vomiting or dizziness    PAST MEDICAL & SURGICAL HISTORY:  DM (diabetes mellitus)      HTN (hypertension)      HLD (hyperlipidemia)      Hypothyroid      GERD (gastroesophageal reflux disease)      Glaucoma      Hypertension      Diabetes mellitus      Hypothyroidism      Status post cholecystectomy      Subluxed cataract of both eyes      No significant past surgical history        eggs (Other)  eggs (Stomach Upset)  No Known Drug Allergies    Home Medications Reviewed  Hospital Medications:   MEDICATIONS  (STANDING):  amLODIPine   Tablet 10 milliGRAM(s) Oral daily  artificial tears (preservative free) Ophthalmic Solution 1 Drop(s) Both EYES three times a day  ascorbic acid 500 milliGRAM(s) Oral daily  aspirin enteric coated 81 milliGRAM(s) Oral daily  atorvastatin 40 milliGRAM(s) Oral at bedtime  carvedilol 12.5 milliGRAM(s) Oral every 12 hours  cyanocobalamin 1000 MICROGram(s) Oral daily  dextrose 5%. 1000 milliLiter(s) (100 mL/Hr) IV Continuous <Continuous>  dextrose 5%. 1000 milliLiter(s) (50 mL/Hr) IV Continuous <Continuous>  dextrose 50% Injectable 25 Gram(s) IV Push once  dextrose 50% Injectable 12.5 Gram(s) IV Push once  dextrose 50% Injectable 25 Gram(s) IV Push once  glucagon  Injectable 1 milliGRAM(s) IntraMuscular once  heparin   Injectable 5000 Unit(s) SubCutaneous every 12 hours  insulin lispro (ADMELOG) corrective regimen sliding scale   SubCutaneous three times a day before meals  insulin lispro (ADMELOG) corrective regimen sliding scale   SubCutaneous at bedtime  latanoprost 0.005% Ophthalmic Solution 1 Drop(s) Both EYES at bedtime  levothyroxine 100 MICROGram(s) Oral daily  losartan 100 milliGRAM(s) Oral daily  meclizine 25 milliGRAM(s) Oral every 8 hours  pantoprazole    Tablet 40 milliGRAM(s) Oral before breakfast  senna 2 Tablet(s) Oral at bedtime    SOCIAL HISTORY:  Denies ETOh,Smoking,   FAMILY HISTORY:  Family history of heart attack (Father)  father      REVIEW OF SYSTEMS:  CONSTITUTIONAL: No weakness, fevers or chills  EYES/ENT: No visual changes;  No vertigo or throat pain   NECK: No pain or stiffness  RESPIRATORY: No cough, wheezing, hemoptysis; No shortness of breath  CARDIOVASCULAR: No chest pain or palpitations.  GASTROINTESTINAL: No abdominal or epigastric pain. No nausea, vomiting, or hematemesis; No diarrhea or constipation. No melena or hematochezia.  GENITOURINARY: No dysuria, frequency, foamy urine, urinary urgency, incontinence or hematuria  NEUROLOGICAL: No numbness or weakness  SKIN: No itching, burning, rashes, or lesions   VASCULAR: No bilateral lower extremity edema.   All other review of systems is negative unless indicated above.    VITALS:  T(F): 98.4 (23 @ 09:39), Max: 98.4 (23 @ 15:02)  HR: 74 (23 @ 09:39)  BP: 109/70 (23 @ 09:39)  RR: 18 (23 @ 09:39)  SpO2: 99% (23 @ 09:39)  Wt(kg): --     @ 07:01  -   @ 07:00  --------------------------------------------------------  IN: 240 mL / OUT: 0 mL / NET: 240 mL        Weight (kg): 59.4 ( @ 06:44)  PHYSICAL EXAM:  Constitutional: NAD  HEENT: anicteric sclera, oropharynx clear, MMM  Neck: No JVD  Respiratory: CTAB, no wheezes, rales or rhonchi  Cardiovascular: S1, S2, RRR  Gastrointestinal: BS+, soft, NT/ND  Extremities: No cyanosis or clubbing. No peripheral edema  Neurological: A/O x 3, no focal deficits  Psychiatric: Normal mood, normal affect  : No CVA tenderness. No beckwith.   Skin: No rashes    LABS:      128<L>  |  94<L>  |  15  ----------------------------<  101<H>  4.5   |  21<L>  |  1.13    Ca    9.6      2023 07:18  Mg     1.6         TPro  7.3  /  Alb  3.9  /  TBili  0.4  /  DBili      /  AST  12  /  ALT  12  /  AlkPhos  67      Creatinine Trend: 1.13 <--, 1.19 <--                        8.8    7.43  )-----------( 344      ( 2023 07:18 )             26.4     Urine Studies:  Urinalysis Basic - ( 2023 19:19 )    Color: Colorless / Appearance: Clear / S.016 / pH:   Gluc:  / Ketone: Negative  / Bili: Negative / Urobili: Negative   Blood:  / Protein: Negative / Nitrite: Negative   Leuk Esterase: Negative / RBC:  / WBC    Sq Epi:  / Non Sq Epi:  / Bacteria:       Osmolality, Random Urine: 305 mos/kg ( @ 06:41)  Sodium, Random Urine: 56 mmol/L ( @ 06:41)    RADIOLOGY & ADDITIONAL STUDIES:

## 2023-04-12 NOTE — PHYSICAL THERAPY INITIAL EVALUATION ADULT - PERTINENT HX OF CURRENT PROBLEM, REHAB EVAL
pt is a 77y F pmh htn, dm, hld presents for c/o dizziness (room spinning)  x 3 weeks. Patient states that she saw her doctor few days ago and was placed on meclizine, has been taking med as directed  x4d w/minimal sx improvement. She also reports associated weakness, endorsed falling over past 2d, denies injury or head trauma.  She is unable to walk due to symptoms.  Of note she had  right side posterior HA & recent right ear infection  apx 1mo ago, s/p abx course. Denies CP, SOB, palpitation, N/V/D, fever, cough, chills, abm pain, recent travel, sick contact, change in bowel and urinary habits. Xray chest: Clear lungs. CT BRAIN: No acute intracranial mass effect, hemorrhage, midline shift or extra-axial fluid collection. CT ANGIOGRAPHY NECK: No evidence of hemodynamically significant stenosis using NASCET   criteria. Patent vertebral arteries. No evidence of vascular dissection. CT ANGIOGRAPHY BRAIN: No major vessel occlusion or proximal stenosis.

## 2023-04-12 NOTE — PHYSICAL THERAPY INITIAL EVALUATION ADULT - NSPTDISCHREC_GEN_A_CORE
PT recommends Home PT to increase overall strength, balance and enudrance. Pt owns a stick she uses as a cane. DME recommendations: Rolling walker for longer distances and increased stability/support. CM Earline aware./Home PT

## 2023-04-12 NOTE — CONSULT NOTE ADULT - ASSESSMENT
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

## 2023-04-12 NOTE — PHYSICAL THERAPY INITIAL EVALUATION ADULT - ADDITIONAL COMMENTS
pt lives in private house with son. 3 SAMMY with HR, 1 floor house. pt has a tub shower. pt uses a stick that uses as a cane for ambulation, she owns no other DME. pt has an aide 4 hours, 7 days a week that assist was ADLs, cleaning and cooking.

## 2023-04-12 NOTE — PHYSICAL THERAPY INITIAL EVALUATION ADULT - PLANNED THERAPY INTERVENTIONS, PT EVAL
Stairs: GOAL: Pt will ascend/descend 3 stairs (I) in order to return home safety in 2 weeks./gait training/strengthening

## 2023-04-13 LAB
ALBUMIN SERPL ELPH-MCNC: 3.4 G/DL — SIGNIFICANT CHANGE UP (ref 3.3–5)
ALP SERPL-CCNC: 54 U/L — SIGNIFICANT CHANGE UP (ref 40–120)
ALT FLD-CCNC: 13 U/L — SIGNIFICANT CHANGE UP (ref 10–45)
ANION GAP SERPL CALC-SCNC: 11 MMOL/L — SIGNIFICANT CHANGE UP (ref 5–17)
ANION GAP SERPL CALC-SCNC: 11 MMOL/L — SIGNIFICANT CHANGE UP (ref 5–17)
AST SERPL-CCNC: 12 U/L — SIGNIFICANT CHANGE UP (ref 10–40)
BILIRUB SERPL-MCNC: 0.3 MG/DL — SIGNIFICANT CHANGE UP (ref 0.2–1.2)
BUN SERPL-MCNC: 17 MG/DL — SIGNIFICANT CHANGE UP (ref 7–23)
BUN SERPL-MCNC: 18 MG/DL — SIGNIFICANT CHANGE UP (ref 7–23)
CALCIUM SERPL-MCNC: 8.8 MG/DL — SIGNIFICANT CHANGE UP (ref 8.4–10.5)
CALCIUM SERPL-MCNC: 9.1 MG/DL — SIGNIFICANT CHANGE UP (ref 8.4–10.5)
CHLORIDE SERPL-SCNC: 96 MMOL/L — SIGNIFICANT CHANGE UP (ref 96–108)
CHLORIDE SERPL-SCNC: 98 MMOL/L — SIGNIFICANT CHANGE UP (ref 96–108)
CO2 SERPL-SCNC: 20 MMOL/L — LOW (ref 22–31)
CO2 SERPL-SCNC: 21 MMOL/L — LOW (ref 22–31)
CREAT SERPL-MCNC: 1.19 MG/DL — SIGNIFICANT CHANGE UP (ref 0.5–1.3)
CREAT SERPL-MCNC: 1.2 MG/DL — SIGNIFICANT CHANGE UP (ref 0.5–1.3)
EGFR: 47 ML/MIN/1.73M2 — LOW
EGFR: 47 ML/MIN/1.73M2 — LOW
GLUCOSE BLDC GLUCOMTR-MCNC: 192 MG/DL — HIGH (ref 70–99)
GLUCOSE BLDC GLUCOMTR-MCNC: 219 MG/DL — HIGH (ref 70–99)
GLUCOSE BLDC GLUCOMTR-MCNC: 248 MG/DL — HIGH (ref 70–99)
GLUCOSE BLDC GLUCOMTR-MCNC: 257 MG/DL — HIGH (ref 70–99)
GLUCOSE SERPL-MCNC: 132 MG/DL — HIGH (ref 70–99)
GLUCOSE SERPL-MCNC: 236 MG/DL — HIGH (ref 70–99)
HCT VFR BLD CALC: 24.5 % — LOW (ref 34.5–45)
HCT VFR BLD CALC: 24.6 % — LOW (ref 34.5–45)
HGB BLD-MCNC: 8 G/DL — LOW (ref 11.5–15.5)
HGB BLD-MCNC: 8 G/DL — LOW (ref 11.5–15.5)
MCHC RBC-ENTMCNC: 27.1 PG — SIGNIFICANT CHANGE UP (ref 27–34)
MCHC RBC-ENTMCNC: 27.3 PG — SIGNIFICANT CHANGE UP (ref 27–34)
MCHC RBC-ENTMCNC: 32.5 GM/DL — SIGNIFICANT CHANGE UP (ref 32–36)
MCHC RBC-ENTMCNC: 32.7 GM/DL — SIGNIFICANT CHANGE UP (ref 32–36)
MCV RBC AUTO: 83.1 FL — SIGNIFICANT CHANGE UP (ref 80–100)
MCV RBC AUTO: 84 FL — SIGNIFICANT CHANGE UP (ref 80–100)
NRBC # BLD: 0 /100 WBCS — SIGNIFICANT CHANGE UP (ref 0–0)
NRBC # BLD: 0 /100 WBCS — SIGNIFICANT CHANGE UP (ref 0–0)
PLATELET # BLD AUTO: 306 K/UL — SIGNIFICANT CHANGE UP (ref 150–400)
PLATELET # BLD AUTO: 313 K/UL — SIGNIFICANT CHANGE UP (ref 150–400)
POTASSIUM SERPL-MCNC: 4.5 MMOL/L — SIGNIFICANT CHANGE UP (ref 3.5–5.3)
POTASSIUM SERPL-MCNC: 4.6 MMOL/L — SIGNIFICANT CHANGE UP (ref 3.5–5.3)
POTASSIUM SERPL-SCNC: 4.5 MMOL/L — SIGNIFICANT CHANGE UP (ref 3.5–5.3)
POTASSIUM SERPL-SCNC: 4.6 MMOL/L — SIGNIFICANT CHANGE UP (ref 3.5–5.3)
PROT SERPL-MCNC: 6.5 G/DL — SIGNIFICANT CHANGE UP (ref 6–8.3)
RBC # BLD: 2.93 M/UL — LOW (ref 3.8–5.2)
RBC # BLD: 2.95 M/UL — LOW (ref 3.8–5.2)
RBC # FLD: 14.1 % — SIGNIFICANT CHANGE UP (ref 10.3–14.5)
RBC # FLD: 14.3 % — SIGNIFICANT CHANGE UP (ref 10.3–14.5)
SODIUM SERPL-SCNC: 128 MMOL/L — LOW (ref 135–145)
SODIUM SERPL-SCNC: 129 MMOL/L — LOW (ref 135–145)
WBC # BLD: 6.64 K/UL — SIGNIFICANT CHANGE UP (ref 3.8–10.5)
WBC # BLD: 7.1 K/UL — SIGNIFICANT CHANGE UP (ref 3.8–10.5)
WBC # FLD AUTO: 6.64 K/UL — SIGNIFICANT CHANGE UP (ref 3.8–10.5)
WBC # FLD AUTO: 7.1 K/UL — SIGNIFICANT CHANGE UP (ref 3.8–10.5)

## 2023-04-13 RX ADMIN — Medication 81 MILLIGRAM(S): at 13:45

## 2023-04-13 RX ADMIN — Medication 1: at 22:24

## 2023-04-13 RX ADMIN — Medication 2: at 13:42

## 2023-04-13 RX ADMIN — Medication 1 DROP(S): at 13:46

## 2023-04-13 RX ADMIN — ATORVASTATIN CALCIUM 40 MILLIGRAM(S): 80 TABLET, FILM COATED ORAL at 22:23

## 2023-04-13 RX ADMIN — LATANOPROST 1 DROP(S): 0.05 SOLUTION/ DROPS OPHTHALMIC; TOPICAL at 22:25

## 2023-04-13 RX ADMIN — Medication 1 DROP(S): at 05:46

## 2023-04-13 RX ADMIN — Medication 100 MICROGRAM(S): at 05:33

## 2023-04-13 RX ADMIN — HEPARIN SODIUM 5000 UNIT(S): 5000 INJECTION INTRAVENOUS; SUBCUTANEOUS at 05:54

## 2023-04-13 RX ADMIN — HEPARIN SODIUM 5000 UNIT(S): 5000 INJECTION INTRAVENOUS; SUBCUTANEOUS at 18:04

## 2023-04-13 RX ADMIN — Medication 500 MILLIGRAM(S): at 13:45

## 2023-04-13 RX ADMIN — Medication 25 MILLIGRAM(S): at 13:46

## 2023-04-13 RX ADMIN — AMLODIPINE BESYLATE 10 MILLIGRAM(S): 2.5 TABLET ORAL at 05:46

## 2023-04-13 RX ADMIN — PANTOPRAZOLE SODIUM 40 MILLIGRAM(S): 20 TABLET, DELAYED RELEASE ORAL at 05:55

## 2023-04-13 RX ADMIN — CARVEDILOL PHOSPHATE 12.5 MILLIGRAM(S): 80 CAPSULE, EXTENDED RELEASE ORAL at 05:46

## 2023-04-13 RX ADMIN — Medication 2: at 18:02

## 2023-04-13 RX ADMIN — PREGABALIN 1000 MICROGRAM(S): 225 CAPSULE ORAL at 13:44

## 2023-04-13 RX ADMIN — LOSARTAN POTASSIUM 100 MILLIGRAM(S): 100 TABLET, FILM COATED ORAL at 05:46

## 2023-04-13 RX ADMIN — Medication 1: at 08:58

## 2023-04-13 RX ADMIN — Medication 25 MILLIGRAM(S): at 22:24

## 2023-04-13 RX ADMIN — Medication 1 DROP(S): at 22:25

## 2023-04-13 RX ADMIN — Medication 25 MILLIGRAM(S): at 05:34

## 2023-04-13 NOTE — PROGRESS NOTE ADULT - SUBJECTIVE AND OBJECTIVE BOX
Patient is a 77y old  Female who presents with a chief complaint of dizziness (13 Apr 2023 15:06)      SUBJECTIVE / OVERNIGHT EVENTS:    Events noted.  CONSTITUTIONAL: No fever,  or fatigue  RESPIRATORY: No cough, wheezing,  No shortness of breath  CARDIOVASCULAR: No chest pain, palpitations, dizziness, or leg swelling  GASTROINTESTINAL: No abdominal or epigastric pain.       MEDICATIONS  (STANDING):  amLODIPine   Tablet 10 milliGRAM(s) Oral daily  artificial tears (preservative free) Ophthalmic Solution 1 Drop(s) Both EYES three times a day  ascorbic acid 500 milliGRAM(s) Oral daily  aspirin enteric coated 81 milliGRAM(s) Oral daily  atorvastatin 40 milliGRAM(s) Oral at bedtime  carvedilol 12.5 milliGRAM(s) Oral every 12 hours  cyanocobalamin 1000 MICROGram(s) Oral daily  dextrose 5%. 1000 milliLiter(s) (100 mL/Hr) IV Continuous <Continuous>  dextrose 5%. 1000 milliLiter(s) (50 mL/Hr) IV Continuous <Continuous>  dextrose 50% Injectable 25 Gram(s) IV Push once  dextrose 50% Injectable 12.5 Gram(s) IV Push once  dextrose 50% Injectable 25 Gram(s) IV Push once  glucagon  Injectable 1 milliGRAM(s) IntraMuscular once  heparin   Injectable 5000 Unit(s) SubCutaneous every 12 hours  insulin lispro (ADMELOG) corrective regimen sliding scale   SubCutaneous three times a day before meals  insulin lispro (ADMELOG) corrective regimen sliding scale   SubCutaneous at bedtime  latanoprost 0.005% Ophthalmic Solution 1 Drop(s) Both EYES at bedtime  levothyroxine 100 MICROGram(s) Oral daily  losartan 100 milliGRAM(s) Oral daily  meclizine 25 milliGRAM(s) Oral every 8 hours  pantoprazole    Tablet 40 milliGRAM(s) Oral before breakfast  senna 2 Tablet(s) Oral at bedtime  sodium chloride 1.5%. 500 milliLiter(s) (50 mL/Hr) IV Continuous <Continuous>    MEDICATIONS  (PRN):  acetaminophen     Tablet .. 650 milliGRAM(s) Oral every 6 hours PRN Temp greater or equal to 38C (100.4F), Mild Pain (1 - 3)  aluminum hydroxide/magnesium hydroxide/simethicone Suspension 30 milliLiter(s) Oral every 4 hours PRN Dyspepsia  dextrose Oral Gel 15 Gram(s) Oral once PRN Blood Glucose LESS THAN 70 milliGRAM(s)/deciliter  diazepam    Tablet 5 milliGRAM(s) Oral at bedtime PRN Dizziness  melatonin 3 milliGRAM(s) Oral at bedtime PRN Insomnia  ondansetron Injectable 4 milliGRAM(s) IV Push every 8 hours PRN Nausea and/or Vomiting        CAPILLARY BLOOD GLUCOSE      POCT Blood Glucose.: 257 mg/dL (13 Apr 2023 21:36)  POCT Blood Glucose.: 219 mg/dL (13 Apr 2023 17:05)  POCT Blood Glucose.: 248 mg/dL (13 Apr 2023 12:52)  POCT Blood Glucose.: 192 mg/dL (13 Apr 2023 08:06)    I&O's Summary    12 Apr 2023 07:01  -  13 Apr 2023 07:00  --------------------------------------------------------  IN: 1260 mL / OUT: 0 mL / NET: 1260 mL    13 Apr 2023 07:01  -  13 Apr 2023 22:58  --------------------------------------------------------  IN: 480 mL / OUT: 0 mL / NET: 480 mL        T(C): 36.9 (04-13-23 @ 19:24), Max: 37.1 (04-13-23 @ 13:12)  HR: 79 (04-13-23 @ 19:24) (74 - 79)  BP: 120/67 (04-13-23 @ 19:24) (100/58 - 120/67)  RR: 18 (04-13-23 @ 19:24) (18 - 18)  SpO2: 99% (04-13-23 @ 19:24) (98% - 100%)    PHYSICAL EXAM:    NECK: Supple, No JVD  CHEST/LUNG: Clear to auscultation bilaterally; No wheezing.  HEART: Regular rate and rhythm; No murmurs, rubs, or gallops  ABDOMEN: Soft, Nontender, Nondistended; Bowel sounds present  EXTREMITIES:   No edema  NEUROLOGY: AAO       LABS:                        8.0    6.64  )-----------( 313      ( 13 Apr 2023 09:22 )             24.5     04-13    128<L>  |  96  |  18  ----------------------------<  236<H>  4.5   |  21<L>  |  1.19    Ca    9.1      13 Apr 2023 16:35    TPro  6.5  /  Alb  3.4  /  TBili  0.3  /  DBili  x   /  AST  12  /  ALT  13  /  AlkPhos  54  04-13    PT/INR - ( 12 Apr 2023 07:18 )   PT: 13.1 sec;   INR: 1.14 ratio                 CAPILLARY BLOOD GLUCOSE      POCT Blood Glucose.: 257 mg/dL (13 Apr 2023 21:36)  POCT Blood Glucose.: 219 mg/dL (13 Apr 2023 17:05)  POCT Blood Glucose.: 248 mg/dL (13 Apr 2023 12:52)  POCT Blood Glucose.: 192 mg/dL (13 Apr 2023 08:06)        RADIOLOGY & ADDITIONAL TESTS:    Imaging Personally Reviewed:    Consultant(s) Notes Reviewed:      Care Discussed with Consultants/Other Providers:    Young Persaud MD, CMD, FACP    [address] [place] [address]  Office Tel: [phone]  Cell: [phone]

## 2023-04-13 NOTE — PROGRESS NOTE ADULT - SUBJECTIVE AND OBJECTIVE BOX
Patient seen and examined  no complaints    eggs (Other)  eggs (Stomach Upset)  No Known Drug Allergies    Hospital Medications:   MEDICATIONS  (STANDING):  amLODIPine   Tablet 10 milliGRAM(s) Oral daily  artificial tears (preservative free) Ophthalmic Solution 1 Drop(s) Both EYES three times a day  ascorbic acid 500 milliGRAM(s) Oral daily  aspirin enteric coated 81 milliGRAM(s) Oral daily  atorvastatin 40 milliGRAM(s) Oral at bedtime  carvedilol 12.5 milliGRAM(s) Oral every 12 hours  cyanocobalamin 1000 MICROGram(s) Oral daily  dextrose 5%. 1000 milliLiter(s) (100 mL/Hr) IV Continuous <Continuous>  dextrose 5%. 1000 milliLiter(s) (50 mL/Hr) IV Continuous <Continuous>  dextrose 50% Injectable 25 Gram(s) IV Push once  dextrose 50% Injectable 12.5 Gram(s) IV Push once  dextrose 50% Injectable 25 Gram(s) IV Push once  glucagon  Injectable 1 milliGRAM(s) IntraMuscular once  heparin   Injectable 5000 Unit(s) SubCutaneous every 12 hours  insulin lispro (ADMELOG) corrective regimen sliding scale   SubCutaneous three times a day before meals  insulin lispro (ADMELOG) corrective regimen sliding scale   SubCutaneous at bedtime  latanoprost 0.005% Ophthalmic Solution 1 Drop(s) Both EYES at bedtime  levothyroxine 100 MICROGram(s) Oral daily  losartan 100 milliGRAM(s) Oral daily  meclizine 25 milliGRAM(s) Oral every 8 hours  pantoprazole    Tablet 40 milliGRAM(s) Oral before breakfast  senna 2 Tablet(s) Oral at bedtime  sodium chloride 1.5%. 500 milliLiter(s) (50 mL/Hr) IV Continuous <Continuous>        VITALS:  T(F): 98.8 (23 @ 13:12), Max: 98.8 (23 @ 20:18)  HR: 79 (23 @ 13:12)  BP: 100/58 (23 @ 13:12)  RR: 18 (23 @ 13:12)  SpO2: 100% (23 @ 13:12)  Wt(kg): --     @ 07:01  -   @ 07:00  --------------------------------------------------------  IN: 1260 mL / OUT: 0 mL / NET: 1260 mL     @ 07:01  -   @ 15:06  --------------------------------------------------------  IN: 480 mL / OUT: 0 mL / NET: 480 mL  PHYSICAL EXAM:  Constitutional: NAD  HEENT: anicteric sclera, oropharynx clear, MMM  Neck: No JVD  Respiratory: CTAB, no wheezes, rales or rhonchi  Cardiovascular: S1, S2, RRR  Gastrointestinal: BS+, soft, NT/ND  Extremities: No cyanosis or clubbing. No peripheral edema  Neurological: A/O x 3, no focal deficits  Psychiatric: Normal mood, normal affect  : No CVA tenderness. No beckwith.   Skin: No rashes  LABS:      129<L>  |  98  |  17  ----------------------------<  132<H>  4.6   |  20<L>  |  1.20    Ca    8.8      2023 04:46  Mg     1.6         TPro  6.5  /  Alb  3.4  /  TBili  0.3  /  DBili      /  AST  12  /  ALT  13  /  AlkPhos  54      Creatinine Trend: 1.20 <--, 1.26 <--, 1.13 <--, 1.19 <--                        8.0    6.64  )-----------( 313      ( 2023 09:22 )             24.5     Urine Studies:  Urinalysis Basic - ( 2023 19:19 )    Color: Colorless / Appearance: Clear / S.016 / pH:   Gluc:  / Ketone: Negative  / Bili: Negative / Urobili: Negative   Blood:  / Protein: Negative / Nitrite: Negative   Leuk Esterase: Negative / RBC:  / WBC    Sq Epi:  / Non Sq Epi:  / Bacteria:       Osmolality, Random Urine: 305 mos/kg (0412 @ 06:41)  Sodium, Random Urine: 56 mmol/L ( @ 06:41)    RADIOLOGY & ADDITIONAL STUDIES:

## 2023-04-14 LAB
ANION GAP SERPL CALC-SCNC: 12 MMOL/L — SIGNIFICANT CHANGE UP (ref 5–17)
BUN SERPL-MCNC: 12 MG/DL — SIGNIFICANT CHANGE UP (ref 7–23)
CALCIUM SERPL-MCNC: 9.3 MG/DL — SIGNIFICANT CHANGE UP (ref 8.4–10.5)
CHLORIDE SERPL-SCNC: 95 MMOL/L — LOW (ref 96–108)
CO2 SERPL-SCNC: 21 MMOL/L — LOW (ref 22–31)
CREAT SERPL-MCNC: 1.14 MG/DL — SIGNIFICANT CHANGE UP (ref 0.5–1.3)
EGFR: 50 ML/MIN/1.73M2 — LOW
GLUCOSE BLDC GLUCOMTR-MCNC: 189 MG/DL — HIGH (ref 70–99)
GLUCOSE BLDC GLUCOMTR-MCNC: 284 MG/DL — HIGH (ref 70–99)
GLUCOSE BLDC GLUCOMTR-MCNC: 293 MG/DL — HIGH (ref 70–99)
GLUCOSE BLDC GLUCOMTR-MCNC: 313 MG/DL — HIGH (ref 70–99)
GLUCOSE SERPL-MCNC: 150 MG/DL — HIGH (ref 70–99)
OSMOLALITY SERPL: 278 MOSMOL/KG — LOW (ref 280–301)
OSMOLALITY UR: 349 MOS/KG — SIGNIFICANT CHANGE UP (ref 300–900)
POTASSIUM SERPL-MCNC: 4.2 MMOL/L — SIGNIFICANT CHANGE UP (ref 3.5–5.3)
POTASSIUM SERPL-SCNC: 4.2 MMOL/L — SIGNIFICANT CHANGE UP (ref 3.5–5.3)
SODIUM SERPL-SCNC: 128 MMOL/L — LOW (ref 135–145)
SODIUM UR-SCNC: 64 MMOL/L — SIGNIFICANT CHANGE UP

## 2023-04-14 PROCEDURE — 99223 1ST HOSP IP/OBS HIGH 75: CPT | Mod: GC

## 2023-04-14 RX ORDER — FLUOCINONIDE/EMOLLIENT BASE 0.05 %
1 CREAM (GRAM) TOPICAL
Refills: 0 | Status: DISCONTINUED | OUTPATIENT
Start: 2023-04-14 | End: 2023-04-16

## 2023-04-14 RX ORDER — SODIUM CHLORIDE 9 MG/ML
1 INJECTION INTRAMUSCULAR; INTRAVENOUS; SUBCUTANEOUS
Refills: 0 | Status: DISCONTINUED | OUTPATIENT
Start: 2023-04-14 | End: 2023-04-15

## 2023-04-14 RX ADMIN — Medication 1 DROP(S): at 06:01

## 2023-04-14 RX ADMIN — AMLODIPINE BESYLATE 10 MILLIGRAM(S): 2.5 TABLET ORAL at 06:01

## 2023-04-14 RX ADMIN — PREGABALIN 1000 MICROGRAM(S): 225 CAPSULE ORAL at 12:47

## 2023-04-14 RX ADMIN — Medication 81 MILLIGRAM(S): at 12:47

## 2023-04-14 RX ADMIN — Medication 500 MILLIGRAM(S): at 12:47

## 2023-04-14 RX ADMIN — Medication 25 MILLIGRAM(S): at 21:39

## 2023-04-14 RX ADMIN — Medication 1 DROP(S): at 21:40

## 2023-04-14 RX ADMIN — LOSARTAN POTASSIUM 100 MILLIGRAM(S): 100 TABLET, FILM COATED ORAL at 06:01

## 2023-04-14 RX ADMIN — Medication 100 MICROGRAM(S): at 06:01

## 2023-04-14 RX ADMIN — Medication 3: at 12:47

## 2023-04-14 RX ADMIN — CARVEDILOL PHOSPHATE 12.5 MILLIGRAM(S): 80 CAPSULE, EXTENDED RELEASE ORAL at 17:16

## 2023-04-14 RX ADMIN — HEPARIN SODIUM 5000 UNIT(S): 5000 INJECTION INTRAVENOUS; SUBCUTANEOUS at 06:01

## 2023-04-14 RX ADMIN — Medication 1: at 08:56

## 2023-04-14 RX ADMIN — Medication 1 DROP(S): at 13:37

## 2023-04-14 RX ADMIN — Medication 4: at 17:16

## 2023-04-14 RX ADMIN — ATORVASTATIN CALCIUM 40 MILLIGRAM(S): 80 TABLET, FILM COATED ORAL at 21:41

## 2023-04-14 RX ADMIN — Medication 25 MILLIGRAM(S): at 06:02

## 2023-04-14 RX ADMIN — PANTOPRAZOLE SODIUM 40 MILLIGRAM(S): 20 TABLET, DELAYED RELEASE ORAL at 06:00

## 2023-04-14 RX ADMIN — CARVEDILOL PHOSPHATE 12.5 MILLIGRAM(S): 80 CAPSULE, EXTENDED RELEASE ORAL at 06:01

## 2023-04-14 RX ADMIN — HEPARIN SODIUM 5000 UNIT(S): 5000 INJECTION INTRAVENOUS; SUBCUTANEOUS at 17:16

## 2023-04-14 RX ADMIN — Medication 25 MILLIGRAM(S): at 13:37

## 2023-04-14 RX ADMIN — SODIUM CHLORIDE 1 GRAM(S): 9 INJECTION INTRAMUSCULAR; INTRAVENOUS; SUBCUTANEOUS at 17:16

## 2023-04-14 RX ADMIN — Medication 1: at 21:47

## 2023-04-14 RX ADMIN — LATANOPROST 1 DROP(S): 0.05 SOLUTION/ DROPS OPHTHALMIC; TOPICAL at 21:40

## 2023-04-14 RX ADMIN — SENNA PLUS 2 TABLET(S): 8.6 TABLET ORAL at 21:40

## 2023-04-14 NOTE — PROGRESS NOTE ADULT - SUBJECTIVE AND OBJECTIVE BOX
[facility] - YAZMIN Hayden / YAZMIN Sloan / MOOSE Pro/ YAZMIN Burt/ YAZMIN Rivas/ YENNY Mercedes / JOHN Montoya / SREEKANTH Blunt  ---------------------------------------------------------------------------------------------------------------  seen and examined today for Hyponatremia  Interval : sodium stable  VITALS:  T(F): 98.5 (04-14-23 @ 05:18), Max: 98.8 (04-13-23 @ 13:12)  HR: 75 (04-14-23 @ 05:18)  BP: 109/59 (04-14-23 @ 05:18)  RR: 18 (04-14-23 @ 05:18)  SpO2: 99% (04-14-23 @ 05:18)  Wt(kg): --    04-13 @ 07:01  -  04-14 @ 07:00  --------------------------------------------------------  IN: 480 mL / OUT: 0 mL / NET: 480 mL      Physical Exam :-  Constitutional: NAD  Neck: Supple.  Respiratory: Bilateral equal breath sounds,  Cardiovascular: S1, S2 normal,  Gastrointestinal: Bowel Sounds present, soft, non tender.  Extremities: No edema  Neurological: Alert and Oriented x 3, no focal deficits  Psychiatric: Normal mood, normal affect  Data:-  Allergies :   eggs (Other)  eggs (Stomach Upset)  No Known Drug Allergies    Hospital Medications:   MEDICATIONS  (STANDING):  amLODIPine   Tablet 10 milliGRAM(s) Oral daily  artificial tears (preservative free) Ophthalmic Solution 1 Drop(s) Both EYES three times a day  ascorbic acid 500 milliGRAM(s) Oral daily  aspirin enteric coated 81 milliGRAM(s) Oral daily  atorvastatin 40 milliGRAM(s) Oral at bedtime  carvedilol 12.5 milliGRAM(s) Oral every 12 hours  cyanocobalamin 1000 MICROGram(s) Oral daily  dextrose 5%. 1000 milliLiter(s) (100 mL/Hr) IV Continuous <Continuous>  dextrose 5%. 1000 milliLiter(s) (50 mL/Hr) IV Continuous <Continuous>  dextrose 50% Injectable 25 Gram(s) IV Push once  dextrose 50% Injectable 12.5 Gram(s) IV Push once  dextrose 50% Injectable 25 Gram(s) IV Push once  glucagon  Injectable 1 milliGRAM(s) IntraMuscular once  heparin   Injectable 5000 Unit(s) SubCutaneous every 12 hours  insulin lispro (ADMELOG) corrective regimen sliding scale   SubCutaneous three times a day before meals  insulin lispro (ADMELOG) corrective regimen sliding scale   SubCutaneous at bedtime  latanoprost 0.005% Ophthalmic Solution 1 Drop(s) Both EYES at bedtime  levothyroxine 100 MICROGram(s) Oral daily  losartan 100 milliGRAM(s) Oral daily  meclizine 25 milliGRAM(s) Oral every 8 hours  pantoprazole    Tablet 40 milliGRAM(s) Oral before breakfast  senna 2 Tablet(s) Oral at bedtime  sodium chloride 1 Gram(s) Oral two times a day  sodium chloride 1.5%. 500 milliLiter(s) (50 mL/Hr) IV Continuous <Continuous>    04-14    128<L>  |  95<L>  |  12  ----------------------------<  150<H>  4.2   |  21<L>  |  1.14    Ca    9.3      14 Apr 2023 06:28    TPro  6.5  /  Alb  3.4  /  TBili  0.3  /  DBili      /  AST  12  /  ALT  13  /  AlkPhos  54  04-13    Creatinine Trend: 1.14 <--, 1.19 <--, 1.20 <--, 1.26 <--, 1.13 <--, 1.19 <--                        8.0    6.64  )-----------( 313      ( 13 Apr 2023 09:22 )             24.5

## 2023-04-14 NOTE — PROGRESS NOTE ADULT - PROBLEM SELECTOR PLAN 5
- Hold home DM meds ( Glimepiride, Metformin)   - FSSS  - DASH/CC diet

## 2023-04-14 NOTE — CONSULT NOTE ADULT - ATTENDING COMMENTS
No rash evident today. Residual crusting noted on right side of scalp. Suspect mostly dysesthesia related to recent contact dermatitis. Treatment options are limited in the hospital. May trial topical corticosteroids as above. If persistent, would consider a trial of gabapentin in the outpatient setting.    Erick Shearer MD, PharmD, MPH  Co-Director, Inpatient Dermatology Consultation Service, [facility]
Ms. Jensen  is a 77 year old right handed [place]  woman with a PMHx significant of vascular risk factors and recent history of scalp infection  DM who was admitted due to the dizziness. During my assessment, patient was back to baseline, confirmed that right ear pain as well as difficulty in hearing. No focal deficit. Etiology of dizziness is most likely due to the peripheral etiology. Clinically less suspicious for central etiology. Continue Meclizine for Dizziness. Patient would benefit from ENT evaluation for rule out treatable etiology. Fall precaution and vestibular rehab. There is no further neurology work up needed.   I discussed the diagnosis, treatment plan and prognosis with the patient. Risk benefit and alternatives to the treatment were discussed. All questions and concerns were addressed. The patient demonstrated good understanding of the treatment plan.  If you have any further questions, please do not hesitate to contact our team.  Thank you for allowing us to participate in this patient care.    Azeem Ruth MD .

## 2023-04-14 NOTE — PROGRESS NOTE ADULT - SUBJECTIVE AND OBJECTIVE BOX
Patient is a 77y old  Female who presents with a chief complaint of dizziness (13 Apr 2023 15:06)      SUBJECTIVE / OVERNIGHT EVENTS:    Events noted.  CONSTITUTIONAL: No fever,  or fatigue  RESPIRATORY: No cough, wheezing,  No shortness of breath  CARDIOVASCULAR: No chest pain, palpitations, dizziness, or leg swelling  GASTROINTESTINAL: No abdominal or epigastric pain.       MEDICATIONS  (STANDING):  amLODIPine   Tablet 10 milliGRAM(s) Oral daily  artificial tears (preservative free) Ophthalmic Solution 1 Drop(s) Both EYES three times a day  ascorbic acid 500 milliGRAM(s) Oral daily  aspirin enteric coated 81 milliGRAM(s) Oral daily  atorvastatin 40 milliGRAM(s) Oral at bedtime  carvedilol 12.5 milliGRAM(s) Oral every 12 hours  cyanocobalamin 1000 MICROGram(s) Oral daily  dextrose 5%. 1000 milliLiter(s) (100 mL/Hr) IV Continuous <Continuous>  dextrose 5%. 1000 milliLiter(s) (50 mL/Hr) IV Continuous <Continuous>  dextrose 50% Injectable 25 Gram(s) IV Push once  dextrose 50% Injectable 12.5 Gram(s) IV Push once  dextrose 50% Injectable 25 Gram(s) IV Push once  glucagon  Injectable 1 milliGRAM(s) IntraMuscular once  heparin   Injectable 5000 Unit(s) SubCutaneous every 12 hours  insulin lispro (ADMELOG) corrective regimen sliding scale   SubCutaneous three times a day before meals  insulin lispro (ADMELOG) corrective regimen sliding scale   SubCutaneous at bedtime  latanoprost 0.005% Ophthalmic Solution 1 Drop(s) Both EYES at bedtime  levothyroxine 100 MICROGram(s) Oral daily  losartan 100 milliGRAM(s) Oral daily  meclizine 25 milliGRAM(s) Oral every 8 hours  pantoprazole    Tablet 40 milliGRAM(s) Oral before breakfast  senna 2 Tablet(s) Oral at bedtime  sodium chloride 1.5%. 500 milliLiter(s) (50 mL/Hr) IV Continuous <Continuous>    MEDICATIONS  (PRN):  acetaminophen     Tablet .. 650 milliGRAM(s) Oral every 6 hours PRN Temp greater or equal to 38C (100.4F), Mild Pain (1 - 3)  aluminum hydroxide/magnesium hydroxide/simethicone Suspension 30 milliLiter(s) Oral every 4 hours PRN Dyspepsia  dextrose Oral Gel 15 Gram(s) Oral once PRN Blood Glucose LESS THAN 70 milliGRAM(s)/deciliter  diazepam    Tablet 5 milliGRAM(s) Oral at bedtime PRN Dizziness  melatonin 3 milliGRAM(s) Oral at bedtime PRN Insomnia  ondansetron Injectable 4 milliGRAM(s) IV Push every 8 hours PRN Nausea and/or Vomiting        CAPILLARY BLOOD GLUCOSE      POCT Blood Glucose.: 257 mg/dL (13 Apr 2023 21:36)  POCT Blood Glucose.: 219 mg/dL (13 Apr 2023 17:05)  POCT Blood Glucose.: 248 mg/dL (13 Apr 2023 12:52)  POCT Blood Glucose.: 192 mg/dL (13 Apr 2023 08:06)    I&O's Summary    12 Apr 2023 07:01  -  13 Apr 2023 07:00  --------------------------------------------------------  IN: 1260 mL / OUT: 0 mL / NET: 1260 mL    13 Apr 2023 07:01  -  13 Apr 2023 22:58  --------------------------------------------------------  IN: 480 mL / OUT: 0 mL / NET: 480 mL        T(C): 36.9 (04-13-23 @ 19:24), Max: 37.1 (04-13-23 @ 13:12)  HR: 79 (04-13-23 @ 19:24) (74 - 79)  BP: 120/67 (04-13-23 @ 19:24) (100/58 - 120/67)  RR: 18 (04-13-23 @ 19:24) (18 - 18)  SpO2: 99% (04-13-23 @ 19:24) (98% - 100%)    PHYSICAL EXAM:    NECK: Supple, No JVD  CHEST/LUNG: Clear to auscultation bilaterally; No wheezing.  HEART: Regular rate and rhythm; No murmurs, rubs, or gallops  ABDOMEN: Soft, Nontender, Nondistended; Bowel sounds present  EXTREMITIES:   No edema  NEUROLOGY: AAO       LABS:                        8.0    6.64  )-----------( 313      ( 13 Apr 2023 09:22 )             24.5     04-13    128<L>  |  96  |  18  ----------------------------<  236<H>  4.5   |  21<L>  |  1.19    Ca    9.1      13 Apr 2023 16:35    TPro  6.5  /  Alb  3.4  /  TBili  0.3  /  DBili  x   /  AST  12  /  ALT  13  /  AlkPhos  54  04-13    PT/INR - ( 12 Apr 2023 07:18 )   PT: 13.1 sec;   INR: 1.14 ratio                 CAPILLARY BLOOD GLUCOSE      POCT Blood Glucose.: 257 mg/dL (13 Apr 2023 21:36)  POCT Blood Glucose.: 219 mg/dL (13 Apr 2023 17:05)  POCT Blood Glucose.: 248 mg/dL (13 Apr 2023 12:52)  POCT Blood Glucose.: 192 mg/dL (13 Apr 2023 08:06)        RADIOLOGY & ADDITIONAL TESTS:    Imaging Personally Reviewed:    Consultant(s) Notes Reviewed:      Care Discussed with Consultants/Other Providers:    Young Persaud MD, CMD, FACP    [address] [place] [address]  Office Tel: [phone]  Cell: [phone]

## 2023-04-14 NOTE — PROGRESS NOTE ADULT - PROBLEM SELECTOR PLAN 7
- DVT ppx: Heparin SQ  - GI ppx: PPI   - Diet: DASH/ CC  - PT eval noted

## 2023-04-14 NOTE — CONSULT NOTE ADULT - SUBJECTIVE AND OBJECTIVE BOX
HPI:  77y F pmh htn, dm, hld presents for c/o dizziness (room spinning)  x 3 weeks. Patient states that she saw her doctor few days ago and was placed on meclizine, has been taking med as directed  x4d w/minimal sx improvement. She also reports associated weakness, endorsed falling over past 2d, denies injury or head trauma.  She is unable to walk due to symptoms.  Of note she had  right side posterior HA & recent right ear infection  apx 1mo ago, s/p abx course      Denies CP, SOB, palpitation, N/V/D, fever, cough, chills, abm pain, recent travel, sick contact, change in bowel and urinary habits    (11 Apr 2023 23:44)    Dermatology consulted for scalp itch/burning x 3-4 weeks. States she dyed her hair 1 mo ago. Subsequently developed painful bumps on scalp and in ear, thought to be infectious, started on PO abx and topical bacitracin. Since then infection has improved, but still endorses burning ("pinching") and itching throughout scalp.       PAST MEDICAL & SURGICAL HISTORY:  DM (diabetes mellitus)  HTN (hypertension)  HLD (hyperlipidemia)  Hypothyroid  GERD (gastroesophageal reflux disease)  Glaucoma  Hypertension  Diabetes mellitus  Hypothyroidism  Status post cholecystectomy  Subluxed cataract of both eyes    No significant past surgical history    Review of Systems: ____________________________________  REVIEW OF SYSTEMS      General: no fevers/chills (states she did have fever ~1 mo ago when developed scalp bumps), no lethargy	    Skin/Breast: see HPI  	  Ophthalmologic: no eye pain or change in vision  	  ENMT: no dysphagia or change in hearing    Respiratory and Thorax: no SOB or cough  	  Cardiovascular: no palpitations or chest pain    Gastrointestinal: no abdominal pain or blood in stool     Genitourinary: no dysuria or frequency    Musculoskeletal: no joint pains or weakness	    Neurological: no weakness, numbness, or tingling    MEDICATIONS  (STANDING):  amLODIPine   Tablet 10 milliGRAM(s) Oral daily  artificial tears (preservative free) Ophthalmic Solution 1 Drop(s) Both EYES three times a day  ascorbic acid 500 milliGRAM(s) Oral daily  aspirin enteric coated 81 milliGRAM(s) Oral daily  atorvastatin 40 milliGRAM(s) Oral at bedtime  carvedilol 12.5 milliGRAM(s) Oral every 12 hours  cyanocobalamin 1000 MICROGram(s) Oral daily  dextrose 5%. 1000 milliLiter(s) IV Continuous <Continuous>  dextrose 5%. 1000 milliLiter(s) IV Continuous <Continuous>  dextrose 50% Injectable 25 Gram(s) IV Push once  dextrose 50% Injectable 12.5 Gram(s) IV Push once  dextrose 50% Injectable 25 Gram(s) IV Push once  glucagon  Injectable 1 milliGRAM(s) IntraMuscular once  heparin   Injectable 5000 Unit(s) SubCutaneous every 12 hours  insulin lispro (ADMELOG) corrective regimen sliding scale   SubCutaneous three times a day before meals  insulin lispro (ADMELOG) corrective regimen sliding scale   SubCutaneous at bedtime  latanoprost 0.005% Ophthalmic Solution 1 Drop(s) Both EYES at bedtime  levothyroxine 100 MICROGram(s) Oral daily  losartan 100 milliGRAM(s) Oral daily  meclizine 25 milliGRAM(s) Oral every 8 hours  pantoprazole    Tablet 40 milliGRAM(s) Oral before breakfast  senna 2 Tablet(s) Oral at bedtime  sodium chloride 1 Gram(s) Oral two times a day  sodium chloride 1.5%. 500 milliLiter(s) IV Continuous <Continuous>    ALLERGIES: eggs  eggs  No Known Drug Allergies    SOCIAL HISTORY:  ____________________________________  Social History: Lives at home    FAMILY HISTORY: ____________________________________  FAMILY HISTORY:  Family history of heart attack (Father)  father    VITAL SIGNS LAST 24 HOURS:  T(F): 97.5 (04-14 @ 13:32), Max: 98.5 (04-13 @ 19:24)  HR: 71 (04-14 @ 13:32) (71 - 79)  BP: 97/57 (04-14 @ 13:32) (97/57 - 120/67)  RR: 18 (04-14 @ 13:32) (18 - 18)    ___________________________________  PHYSICAL EXAM:     The patient was alert and oriented X 3, well nourished, and in no  apparent distress.  OP showed no ulcerations  There was no visible lymphadenopathy.  Conjunctiva were non injected  There was no clubbing or edema of extremities.  The scalp, hair, face, eyebrows, lips, OP, neck, chest, back,   extremities X 4, nails were examined.  There was no hyperhidrosis or bromhidrosis.    Of note on skin exam:   pink crusted plaque, R vertex scalp  minimal scale throughout scalp  ____________________________________    LABS:                        8.0    6.64  )-----------( 313      ( 13 Apr 2023 09:22 )             24.5     04-14    128<L>  |  95<L>  |  12  ----------------------------<  150<H>  4.2   |  21<L>  |  1.14    Ca    9.3      14 Apr 2023 06:28    TPro  6.5  /  Alb  3.4  /  TBili  0.3  /  DBili  x   /  AST  12  /  ALT  13  /  AlkPhos  54  04-13       HPI:  77y F pmh htn, dm, hld presents for c/o dizziness (room spinning)  x 3 weeks. Patient states that she saw her doctor few days ago and was placed on meclizine, has been taking med as directed  x4d w/minimal sx improvement. She also reports associated weakness, endorsed falling over past 2d, denies injury or head trauma.  She is unable to walk due to symptoms.  Of note she had  right side posterior HA & recent right ear infection  apx 1mo ago, s/p abx course      Denies CP, SOB, palpitation, N/V/D, fever, cough, chills, abm pain, recent travel, sick contact, change in bowel and urinary habits    (11 Apr 2023 23:44)    Dermatology consulted for scalp itch/burning x 3-4 weeks. States she dyed her hair 1 mo ago. Subsequently developed painful bumps on scalp and in ear, thought to be infectious, started on PO abx and topical bacitracin. Since then infection has improved, but still endorses burning ("pinching") and itching throughout scalp. Notes the sensation has gotten better since it started but still present.      PAST MEDICAL & SURGICAL HISTORY:  DM (diabetes mellitus)  HTN (hypertension)  HLD (hyperlipidemia)  Hypothyroid  GERD (gastroesophageal reflux disease)  Glaucoma  Hypertension  Diabetes mellitus  Hypothyroidism  Status post cholecystectomy  Subluxed cataract of both eyes    No significant past surgical history    Review of Systems: ____________________________________  REVIEW OF SYSTEMS      General: no fevers/chills (states she did have fever ~1 mo ago when developed scalp bumps), no lethargy	    Skin/Breast: see HPI  	  Ophthalmologic: no eye pain or change in vision  	  ENMT: no dysphagia or change in hearing    Respiratory and Thorax: no SOB or cough  	  Cardiovascular: no palpitations or chest pain    Gastrointestinal: no abdominal pain or blood in stool     Genitourinary: no dysuria or frequency    Musculoskeletal: no joint pains or weakness	    Neurological: no weakness, numbness, or tingling    MEDICATIONS  (STANDING):  amLODIPine   Tablet 10 milliGRAM(s) Oral daily  artificial tears (preservative free) Ophthalmic Solution 1 Drop(s) Both EYES three times a day  ascorbic acid 500 milliGRAM(s) Oral daily  aspirin enteric coated 81 milliGRAM(s) Oral daily  atorvastatin 40 milliGRAM(s) Oral at bedtime  carvedilol 12.5 milliGRAM(s) Oral every 12 hours  cyanocobalamin 1000 MICROGram(s) Oral daily  dextrose 5%. 1000 milliLiter(s) IV Continuous <Continuous>  dextrose 5%. 1000 milliLiter(s) IV Continuous <Continuous>  dextrose 50% Injectable 25 Gram(s) IV Push once  dextrose 50% Injectable 12.5 Gram(s) IV Push once  dextrose 50% Injectable 25 Gram(s) IV Push once  glucagon  Injectable 1 milliGRAM(s) IntraMuscular once  heparin   Injectable 5000 Unit(s) SubCutaneous every 12 hours  insulin lispro (ADMELOG) corrective regimen sliding scale   SubCutaneous three times a day before meals  insulin lispro (ADMELOG) corrective regimen sliding scale   SubCutaneous at bedtime  latanoprost 0.005% Ophthalmic Solution 1 Drop(s) Both EYES at bedtime  levothyroxine 100 MICROGram(s) Oral daily  losartan 100 milliGRAM(s) Oral daily  meclizine 25 milliGRAM(s) Oral every 8 hours  pantoprazole    Tablet 40 milliGRAM(s) Oral before breakfast  senna 2 Tablet(s) Oral at bedtime  sodium chloride 1 Gram(s) Oral two times a day  sodium chloride 1.5%. 500 milliLiter(s) IV Continuous <Continuous>    ALLERGIES: eggs  eggs  No Known Drug Allergies    SOCIAL HISTORY:  ____________________________________  Social History: Lives at home    FAMILY HISTORY: ____________________________________  FAMILY HISTORY:  Family history of heart attack (Father)  father    VITAL SIGNS LAST 24 HOURS:  T(F): 97.5 (04-14 @ 13:32), Max: 98.5 (04-13 @ 19:24)  HR: 71 (04-14 @ 13:32) (71 - 79)  BP: 97/57 (04-14 @ 13:32) (97/57 - 120/67)  RR: 18 (04-14 @ 13:32) (18 - 18)    ___________________________________  PHYSICAL EXAM:     The patient was alert and oriented X 3, well nourished, and in no  apparent distress.  OP showed no ulcerations  There was no visible lymphadenopathy.  Conjunctiva were non injected  There was no clubbing or edema of extremities.  The scalp, hair, face, eyebrows, lips, OP, neck, chest, back,   extremities X 4, nails were examined.  There was no hyperhidrosis or bromhidrosis.    Of note on skin exam:   pink crusted plaque, R vertex scalp  minimal scale throughout scalp  ____________________________________    LABS:                        8.0    6.64  )-----------( 313      ( 13 Apr 2023 09:22 )             24.5     04-14    128<L>  |  95<L>  |  12  ----------------------------<  150<H>  4.2   |  21<L>  |  1.14    Ca    9.3      14 Apr 2023 06:28    TPro  6.5  /  Alb  3.4  /  TBili  0.3  /  DBili  x   /  AST  12  /  ALT  13  /  AlkPhos  54  04-13       HPI:  77y F pmh htn, dm, hld presents for c/o dizziness (room spinning)  x 3 weeks. Patient states that she saw her doctor few days ago and was placed on meclizine, has been taking med as directed  x4d w/minimal sx improvement. She also reports associated weakness, endorsed falling over past 2d, denies injury or head trauma.  She is unable to walk due to symptoms.  Of note she had  right side posterior HA & recent right ear infection  apx 1mo ago, s/p abx course      Denies CP, SOB, palpitation, N/V/D, fever, cough, chills, abm pain, recent travel, sick contact, change in bowel and urinary habits    (11 Apr 2023 23:44)    Dermatology consulted for scalp itch/burning x 3-4 weeks. States she dyed her hair 1 mo ago. Subsequently developed painful bumps on scalp and in ear, thought to be infectious, started on PO abx and topical bacitracin. Since then infection has improved, but still endorses burning ("pinching") and itching throughout scalp. Notes the sensation has gotten better since it started but still present.      PAST MEDICAL & SURGICAL HISTORY:  DM (diabetes mellitus)  HTN (hypertension)  HLD (hyperlipidemia)  Hypothyroid  GERD (gastroesophageal reflux disease)  Glaucoma  Hypertension  Diabetes mellitus  Hypothyroidism  Status post cholecystectomy  Subluxed cataract of both eyes    No significant past surgical history    Review of Systems: ____________________________________  REVIEW OF SYSTEMS      General: no fevers/chills (states she did have fever ~1 mo ago when developed scalp bumps), no lethargy	    Skin/Breast: see HPI  	  Ophthalmologic: no eye pain or change in vision  	  ENMT: no dysphagia or change in hearing    Respiratory and Thorax: no SOB or cough  	  Cardiovascular: no palpitations or chest pain    Gastrointestinal: no abdominal pain or blood in stool     Genitourinary: no dysuria or frequency    Musculoskeletal: no joint pains or weakness	    Neurological: no weakness, numbness, or tingling    MEDICATIONS  (STANDING):  amLODIPine   Tablet 10 milliGRAM(s) Oral daily  artificial tears (preservative free) Ophthalmic Solution 1 Drop(s) Both EYES three times a day  ascorbic acid 500 milliGRAM(s) Oral daily  aspirin enteric coated 81 milliGRAM(s) Oral daily  atorvastatin 40 milliGRAM(s) Oral at bedtime  carvedilol 12.5 milliGRAM(s) Oral every 12 hours  cyanocobalamin 1000 MICROGram(s) Oral daily  dextrose 5%. 1000 milliLiter(s) IV Continuous <Continuous>  dextrose 5%. 1000 milliLiter(s) IV Continuous <Continuous>  dextrose 50% Injectable 25 Gram(s) IV Push once  dextrose 50% Injectable 12.5 Gram(s) IV Push once  dextrose 50% Injectable 25 Gram(s) IV Push once  glucagon  Injectable 1 milliGRAM(s) IntraMuscular once  heparin   Injectable 5000 Unit(s) SubCutaneous every 12 hours  insulin lispro (ADMELOG) corrective regimen sliding scale   SubCutaneous three times a day before meals  insulin lispro (ADMELOG) corrective regimen sliding scale   SubCutaneous at bedtime  latanoprost 0.005% Ophthalmic Solution 1 Drop(s) Both EYES at bedtime  levothyroxine 100 MICROGram(s) Oral daily  losartan 100 milliGRAM(s) Oral daily  meclizine 25 milliGRAM(s) Oral every 8 hours  pantoprazole    Tablet 40 milliGRAM(s) Oral before breakfast  senna 2 Tablet(s) Oral at bedtime  sodium chloride 1 Gram(s) Oral two times a day  sodium chloride 1.5%. 500 milliLiter(s) IV Continuous <Continuous>    ALLERGIES: eggs  eggs  No Known Drug Allergies    SOCIAL HISTORY:  ____________________________________  Social History: Lives at home    FAMILY HISTORY: ____________________________________  FAMILY HISTORY:  Family history of heart attack (Father)  father    VITAL SIGNS LAST 24 HOURS:  T(F): 97.5 (04-14 @ 13:32), Max: 98.5 (04-13 @ 19:24)  HR: 71 (04-14 @ 13:32) (71 - 79)  BP: 97/57 (04-14 @ 13:32) (97/57 - 120/67)  RR: 18 (04-14 @ 13:32) (18 - 18)    ___________________________________  PHYSICAL EXAM:     The patient was alert and oriented X 3, and in no  apparent distress.  OP showed no ulcerations  There was no visible lymphadenopathy.  Conjunctiva were non injected  There was no clubbing or edema of extremities.  The scalp, hair, face, eyebrows, lips, OP, neck, chest, back,   extremities X 4, nails were examined.  There was no hyperhidrosis or bromhidrosis.    Of note on skin exam:   pink crusted plaque, R vertex scalp  minimal scale throughout scalp  ____________________________________    LABS:                        8.0    6.64  )-----------( 313      ( 13 Apr 2023 09:22 )             24.5     04-14    128<L>  |  95<L>  |  12  ----------------------------<  150<H>  4.2   |  21<L>  |  1.14    Ca    9.3      14 Apr 2023 06:28    TPro  6.5  /  Alb  3.4  /  TBili  0.3  /  DBili  x   /  AST  12  /  ALT  13  /  AlkPhos  54  04-13

## 2023-04-14 NOTE — CONSULT NOTE ADULT - ASSESSMENT
# Scalp dysesthesia, likely in the setting of recent scalp inflammation after hair dye use  - Would ideally recommend fluocinonide 0.05% solution to be applied BID to AAs on scalp for up to 2 weeks on, one week off. If unavailable to obtain through pharmacy, may start clobetasol 0.05% ointment BID for up to 2 weeks on, 1 week off. Explained that this condition can be neuropathic in etiology; therefore, treatment with topical steroids may or may not be helpful. Should continue to improve over time  - In 2-4 weeks, patient can follow up with us in the [facility] located at [address] [place] [address]. [address], [place] [address] upon discharge. Office phone number is [phone].    Patient was seen at bedside and staffed in person with the dermatology attending Dr. Shearer.   Recommendations were communicated with the primary team.  Please page [phone] for further related questions.    Deneen Metz MD  Resident Physician, PGY2  [facility]  Pager: [phone]

## 2023-04-15 LAB
ANION GAP SERPL CALC-SCNC: 14 MMOL/L — SIGNIFICANT CHANGE UP (ref 5–17)
BUN SERPL-MCNC: 13 MG/DL — SIGNIFICANT CHANGE UP (ref 7–23)
CALCIUM SERPL-MCNC: 9.4 MG/DL — SIGNIFICANT CHANGE UP (ref 8.4–10.5)
CHLORIDE SERPL-SCNC: 93 MMOL/L — LOW (ref 96–108)
CO2 SERPL-SCNC: 20 MMOL/L — LOW (ref 22–31)
CREAT SERPL-MCNC: 1.14 MG/DL — SIGNIFICANT CHANGE UP (ref 0.5–1.3)
EGFR: 50 ML/MIN/1.73M2 — LOW
GLUCOSE BLDC GLUCOMTR-MCNC: 214 MG/DL — HIGH (ref 70–99)
GLUCOSE BLDC GLUCOMTR-MCNC: 281 MG/DL — HIGH (ref 70–99)
GLUCOSE BLDC GLUCOMTR-MCNC: 285 MG/DL — HIGH (ref 70–99)
GLUCOSE BLDC GLUCOMTR-MCNC: 305 MG/DL — HIGH (ref 70–99)
GLUCOSE SERPL-MCNC: 145 MG/DL — HIGH (ref 70–99)
HCT VFR BLD CALC: 25.7 % — LOW (ref 34.5–45)
HGB BLD-MCNC: 8.1 G/DL — LOW (ref 11.5–15.5)
MCHC RBC-ENTMCNC: 26.7 PG — LOW (ref 27–34)
MCHC RBC-ENTMCNC: 31.5 GM/DL — LOW (ref 32–36)
MCV RBC AUTO: 84.8 FL — SIGNIFICANT CHANGE UP (ref 80–100)
NRBC # BLD: 0 /100 WBCS — SIGNIFICANT CHANGE UP (ref 0–0)
PLATELET # BLD AUTO: 336 K/UL — SIGNIFICANT CHANGE UP (ref 150–400)
POTASSIUM SERPL-MCNC: 4.6 MMOL/L — SIGNIFICANT CHANGE UP (ref 3.5–5.3)
POTASSIUM SERPL-SCNC: 4.6 MMOL/L — SIGNIFICANT CHANGE UP (ref 3.5–5.3)
RBC # BLD: 3.03 M/UL — LOW (ref 3.8–5.2)
RBC # FLD: 14 % — SIGNIFICANT CHANGE UP (ref 10.3–14.5)
SODIUM SERPL-SCNC: 127 MMOL/L — LOW (ref 135–145)
WBC # BLD: 7.07 K/UL — SIGNIFICANT CHANGE UP (ref 3.8–10.5)
WBC # FLD AUTO: 7.07 K/UL — SIGNIFICANT CHANGE UP (ref 3.8–10.5)

## 2023-04-15 RX ORDER — INSULIN LISPRO 100/ML
4 VIAL (ML) SUBCUTANEOUS
Refills: 0 | Status: DISCONTINUED | OUTPATIENT
Start: 2023-04-15 | End: 2023-04-16

## 2023-04-15 RX ORDER — SODIUM CHLORIDE 9 MG/ML
1 INJECTION INTRAMUSCULAR; INTRAVENOUS; SUBCUTANEOUS THREE TIMES A DAY
Refills: 0 | Status: DISCONTINUED | OUTPATIENT
Start: 2023-04-15 | End: 2023-04-16

## 2023-04-15 RX ADMIN — CARVEDILOL PHOSPHATE 12.5 MILLIGRAM(S): 80 CAPSULE, EXTENDED RELEASE ORAL at 17:31

## 2023-04-15 RX ADMIN — SODIUM CHLORIDE 1 GRAM(S): 9 INJECTION INTRAMUSCULAR; INTRAVENOUS; SUBCUTANEOUS at 05:38

## 2023-04-15 RX ADMIN — ATORVASTATIN CALCIUM 40 MILLIGRAM(S): 80 TABLET, FILM COATED ORAL at 21:46

## 2023-04-15 RX ADMIN — Medication 100 MICROGRAM(S): at 05:37

## 2023-04-15 RX ADMIN — SODIUM CHLORIDE 1 GRAM(S): 9 INJECTION INTRAMUSCULAR; INTRAVENOUS; SUBCUTANEOUS at 15:04

## 2023-04-15 RX ADMIN — HEPARIN SODIUM 5000 UNIT(S): 5000 INJECTION INTRAVENOUS; SUBCUTANEOUS at 17:30

## 2023-04-15 RX ADMIN — Medication 1 DROP(S): at 05:36

## 2023-04-15 RX ADMIN — CARVEDILOL PHOSPHATE 12.5 MILLIGRAM(S): 80 CAPSULE, EXTENDED RELEASE ORAL at 05:37

## 2023-04-15 RX ADMIN — Medication 25 MILLIGRAM(S): at 21:45

## 2023-04-15 RX ADMIN — Medication 25 MILLIGRAM(S): at 13:09

## 2023-04-15 RX ADMIN — Medication 1 DROP(S): at 13:10

## 2023-04-15 RX ADMIN — Medication 81 MILLIGRAM(S): at 11:09

## 2023-04-15 RX ADMIN — Medication 500 MILLIGRAM(S): at 11:09

## 2023-04-15 RX ADMIN — LATANOPROST 1 DROP(S): 0.05 SOLUTION/ DROPS OPHTHALMIC; TOPICAL at 21:45

## 2023-04-15 RX ADMIN — SODIUM CHLORIDE 1 GRAM(S): 9 INJECTION INTRAMUSCULAR; INTRAVENOUS; SUBCUTANEOUS at 21:45

## 2023-04-15 RX ADMIN — Medication 1 APPLICATION(S): at 05:39

## 2023-04-15 RX ADMIN — Medication 3: at 17:30

## 2023-04-15 RX ADMIN — Medication 1 APPLICATION(S): at 21:44

## 2023-04-15 RX ADMIN — PANTOPRAZOLE SODIUM 40 MILLIGRAM(S): 20 TABLET, DELAYED RELEASE ORAL at 05:37

## 2023-04-15 RX ADMIN — Medication 1: at 22:03

## 2023-04-15 RX ADMIN — HEPARIN SODIUM 5000 UNIT(S): 5000 INJECTION INTRAVENOUS; SUBCUTANEOUS at 05:36

## 2023-04-15 RX ADMIN — Medication 4: at 12:13

## 2023-04-15 RX ADMIN — Medication 25 MILLIGRAM(S): at 05:36

## 2023-04-15 RX ADMIN — Medication 2: at 08:34

## 2023-04-15 RX ADMIN — PREGABALIN 1000 MICROGRAM(S): 225 CAPSULE ORAL at 11:09

## 2023-04-15 RX ADMIN — AMLODIPINE BESYLATE 10 MILLIGRAM(S): 2.5 TABLET ORAL at 05:36

## 2023-04-15 RX ADMIN — SENNA PLUS 2 TABLET(S): 8.6 TABLET ORAL at 21:45

## 2023-04-15 RX ADMIN — LOSARTAN POTASSIUM 100 MILLIGRAM(S): 100 TABLET, FILM COATED ORAL at 05:37

## 2023-04-15 NOTE — PROGRESS NOTE ADULT - SUBJECTIVE AND OBJECTIVE BOX
Patient is a 77y old  Female who presents with a chief complaint of dizziness (15 Apr 2023 16:33)      SUBJECTIVE / OVERNIGHT EVENTS:    Events noted.  CONSTITUTIONAL: No fever,  or fatigue  RESPIRATORY: No cough, wheezing,  No shortness of breath  CARDIOVASCULAR: No chest pain, palpitations, dizziness, or leg swelling  GASTROINTESTINAL: No abdominal or epigastric pain. No nausea, vomiting.  NEUROLOGICAL: No headache    MEDICATIONS  (STANDING):  artificial tears (preservative free) Ophthalmic Solution 1 Drop(s) Both EYES three times a day  ascorbic acid 500 milliGRAM(s) Oral daily  aspirin enteric coated 81 milliGRAM(s) Oral daily  atorvastatin 40 milliGRAM(s) Oral at bedtime  carvedilol 12.5 milliGRAM(s) Oral every 12 hours  cyanocobalamin 1000 MICROGram(s) Oral daily  dextrose 5%. 1000 milliLiter(s) (100 mL/Hr) IV Continuous <Continuous>  dextrose 5%. 1000 milliLiter(s) (50 mL/Hr) IV Continuous <Continuous>  dextrose 50% Injectable 25 Gram(s) IV Push once  dextrose 50% Injectable 12.5 Gram(s) IV Push once  dextrose 50% Injectable 25 Gram(s) IV Push once  fluocinonide 0.05% Solution 1 Application(s) Topical two times a day  glucagon  Injectable 1 milliGRAM(s) IntraMuscular once  heparin   Injectable 5000 Unit(s) SubCutaneous every 12 hours  insulin lispro (ADMELOG) corrective regimen sliding scale   SubCutaneous three times a day before meals  insulin lispro (ADMELOG) corrective regimen sliding scale   SubCutaneous at bedtime  latanoprost 0.005% Ophthalmic Solution 1 Drop(s) Both EYES at bedtime  levothyroxine 100 MICROGram(s) Oral daily  losartan 100 milliGRAM(s) Oral daily  meclizine 25 milliGRAM(s) Oral every 8 hours  pantoprazole    Tablet 40 milliGRAM(s) Oral before breakfast  senna 2 Tablet(s) Oral at bedtime  sodium chloride 1 Gram(s) Oral three times a day  sodium chloride 1.5%. 500 milliLiter(s) (50 mL/Hr) IV Continuous <Continuous>    MEDICATIONS  (PRN):  acetaminophen     Tablet .. 650 milliGRAM(s) Oral every 6 hours PRN Temp greater or equal to 38C (100.4F), Mild Pain (1 - 3)  aluminum hydroxide/magnesium hydroxide/simethicone Suspension 30 milliLiter(s) Oral every 4 hours PRN Dyspepsia  dextrose Oral Gel 15 Gram(s) Oral once PRN Blood Glucose LESS THAN 70 milliGRAM(s)/deciliter  diazepam    Tablet 5 milliGRAM(s) Oral at bedtime PRN Dizziness  melatonin 3 milliGRAM(s) Oral at bedtime PRN Insomnia  ondansetron Injectable 4 milliGRAM(s) IV Push every 8 hours PRN Nausea and/or Vomiting        CAPILLARY BLOOD GLUCOSE      POCT Blood Glucose.: 281 mg/dL (15 Apr 2023 17:07)  POCT Blood Glucose.: 305 mg/dL (15 Apr 2023 11:59)  POCT Blood Glucose.: 214 mg/dL (15 Apr 2023 08:19)  POCT Blood Glucose.: 284 mg/dL (14 Apr 2023 21:37)    I&O's Summary    14 Apr 2023 07:01  -  15 Apr 2023 07:00  --------------------------------------------------------  IN: 720 mL / OUT: 0 mL / NET: 720 mL    15 Apr 2023 07:01  -  15 Apr 2023 17:57  --------------------------------------------------------  IN: 480 mL / OUT: 0 mL / NET: 480 mL        T(C): 36.7 (04-15-23 @ 16:39), Max: 36.8 (04-14-23 @ 19:19)  HR: 74 (04-15-23 @ 16:39) (69 - 74)  BP: 115/65 (04-15-23 @ 16:39) (108/67 - 116/72)  RR: 18 (04-15-23 @ 16:39) (18 - 18)  SpO2: 99% (04-15-23 @ 16:39) (99% - 100%)    PHYSICAL EXAM:  GENERAL: NAD  NECK: Supple, No JVD  CHEST/LUNG: Clear to auscultation bilaterally; No wheezing.  HEART: Regular rate and rhythm; No murmurs, rubs, or gallops  ABDOMEN: Soft, Nontender, Nondistended; Bowel sounds present  EXTREMITIES:   No edema  NEUROLOGY: AAO X 3      LABS:                        8.1    7.07  )-----------( 336      ( 15 Apr 2023 06:29 )             25.7     04-15    127<L>  |  93<L>  |  13  ----------------------------<  145<H>  4.6   |  20<L>  |  1.14    Ca    9.4      15 Apr 2023 06:28              CAPILLARY BLOOD GLUCOSE      POCT Blood Glucose.: 281 mg/dL (15 Apr 2023 17:07)  POCT Blood Glucose.: 305 mg/dL (15 Apr 2023 11:59)  POCT Blood Glucose.: 214 mg/dL (15 Apr 2023 08:19)  POCT Blood Glucose.: 284 mg/dL (14 Apr 2023 21:37)        RADIOLOGY & ADDITIONAL TESTS:    Imaging Personally Reviewed:    Consultant(s) Notes Reviewed:      Care Discussed with Consultants/Other Providers:    Young Persaud MD, CMD, FACP    [address] [address]  Office Tel: [phone]  Cell: [phone]

## 2023-04-15 NOTE — PROGRESS NOTE ADULT - SUBJECTIVE AND OBJECTIVE BOX
[facility] - S Jackelyn / Luther S /D Morteza/ S Carmel/ S Evelyn/ Eric Mercedes / ANGELA Martinu/ O Jose Raul  service -[phone], office [phone]  ---------------------------------------------------------------------------------------------------------------    Patient seen and examined bedside    Subjective and Objective: No overnight events, sob resolved. No complaints today. feeling better    Allergies: eggs (Other)  eggs (Stomach Upset)  No Known Drug Allergies      Hospital Medications:   MEDICATIONS  (STANDING):  artificial tears (preservative free) Ophthalmic Solution 1 Drop(s) Both EYES three times a day  ascorbic acid 500 milliGRAM(s) Oral daily  aspirin enteric coated 81 milliGRAM(s) Oral daily  atorvastatin 40 milliGRAM(s) Oral at bedtime  carvedilol 12.5 milliGRAM(s) Oral every 12 hours  cyanocobalamin 1000 MICROGram(s) Oral daily  dextrose 5%. 1000 milliLiter(s) (100 mL/Hr) IV Continuous <Continuous>  dextrose 5%. 1000 milliLiter(s) (50 mL/Hr) IV Continuous <Continuous>  dextrose 50% Injectable 25 Gram(s) IV Push once  dextrose 50% Injectable 12.5 Gram(s) IV Push once  dextrose 50% Injectable 25 Gram(s) IV Push once  fluocinonide 0.05% Solution 1 Application(s) Topical two times a day  glucagon  Injectable 1 milliGRAM(s) IntraMuscular once  heparin   Injectable 5000 Unit(s) SubCutaneous every 12 hours  insulin lispro (ADMELOG) corrective regimen sliding scale   SubCutaneous three times a day before meals  insulin lispro (ADMELOG) corrective regimen sliding scale   SubCutaneous at bedtime  latanoprost 0.005% Ophthalmic Solution 1 Drop(s) Both EYES at bedtime  levothyroxine 100 MICROGram(s) Oral daily  losartan 100 milliGRAM(s) Oral daily  meclizine 25 milliGRAM(s) Oral every 8 hours  pantoprazole    Tablet 40 milliGRAM(s) Oral before breakfast  senna 2 Tablet(s) Oral at bedtime  sodium chloride 1 Gram(s) Oral three times a day  sodium chloride 1.5%. 500 milliLiter(s) (50 mL/Hr) IV Continuous <Continuous>      REVIEW OF SYSTEMS:  CONSTITUTIONAL: No weakness, fevers or chills  EYES/ENT: No visual changes;  No vertigo or throat pain   NECK: No pain or stiffness  RESPIRATORY: No cough, wheezing, hemoptysis; No shortness of breath  CARDIOVASCULAR: No chest pain or palpitations.  GASTROINTESTINAL: No abdominal or epigastric pain. No nausea, vomiting, or hematemesis; No diarrhea or constipation. No melena or hematochezia.  GENITOURINARY: No dysuria, frequency, foamy urine, urinary urgency, incontinence or hematuria  NEUROLOGICAL: No numbness or weakness  SKIN: No itching, burning, rashes, or lesions   VASCULAR: No bilateral lower extremity edema.   All other review of systems is negative unless indicated above.    VITALS:  T(F): 97.5 (04-15-23 @ 11:27), Max: 98.3 (23 @ 19:19)  HR: 69 (04-15-23 @ 11:27)  BP: 108/67 (04-15-23 @ 11:27)  RR: 18 (04-15-23 @ 11:27)  SpO2: 100% (04-15-23 @ 11:27)  Wt(kg): --     @ 07:  -  04-15 @ 07:00  --------------------------------------------------------  IN: 720 mL / OUT: 0 mL / NET: 720 mL    04-15 @ 07:  -  04-15 @ 16:33  --------------------------------------------------------  IN: 480 mL / OUT: 0 mL / NET: 480 mL          PHYSICAL EXAM:  Constitutional: NAD  HEENT: anicteric sclera, oropharynx clear  Neck: No JVD  Respiratory: CTAB, no wheezes, rales or rhonchi  Cardiovascular: S1, S2, RRR  Gastrointestinal: BS+, soft, NT/ND  Extremities: No cyanosis or clubbing. No peripheral edema  Neurological: A/O x 3, no focal deficits  Psychiatric: Normal mood, normal affect  : No CVA tenderness. No beckwith.   Skin: No rashes  Vascular Access:    LABS:  04-15    127<L>  |  93<L>  |  13  ----------------------------<  145<H>  4.6   |  20<L>  |  1.14    Ca    9.4      15 Apr 2023 06:28      Creatinine Trend: 1.14 <--, 1.14 <--, 1.19 <--, 1.20 <--, 1.26 <--, 1.13 <--, 1.19 <--                        8.1    7.07  )-----------( 336      ( 15 Apr 2023 06:29 )             25.7     Urine Studies:  Urinalysis Basic - ( 2023 19:19 )    Color: Colorless / Appearance: Clear / S.016 / pH:   Gluc:  / Ketone: Negative  / Bili: Negative / Urobili: Negative   Blood:  / Protein: Negative / Nitrite: Negative   Leuk Esterase: Negative / RBC:  / WBC    Sq Epi:  / Non Sq Epi:  / Bacteria:       Osmolality, Random Urine: 349 mos/kg ( @ 14:42)  Sodium, Random Urine: 64 mmol/L ( @ 14:42)  Osmolality, Random Urine: 305 mos/kg ( @ 06:41)  Sodium, Random Urine: 56 mmol/L ( @ 06:41)      RADIOLOGY & ADDITIONAL STUDIES:   [facility] - S Jackelyn / Luther S /D Morteza/ S Carmel/ S Evelyn/ Eric Mercedes / ANGELA Montoya/ O Jose Raul  service -[phone], office [phone]  ---------------------------------------------------------------------------------------------------------------    Patient seen and examined bedside    Subjective and Objective: No overnight events, denied N/V/D/PAIN/sob. No complaints today. feeling better    Allergies: eggs (Other)  eggs (Stomach Upset)  No Known Drug Allergies      Hospital Medications:   MEDICATIONS  (STANDING):  artificial tears (preservative free) Ophthalmic Solution 1 Drop(s) Both EYES three times a day  ascorbic acid 500 milliGRAM(s) Oral daily  aspirin enteric coated 81 milliGRAM(s) Oral daily  atorvastatin 40 milliGRAM(s) Oral at bedtime  carvedilol 12.5 milliGRAM(s) Oral every 12 hours  cyanocobalamin 1000 MICROGram(s) Oral daily  dextrose 5%. 1000 milliLiter(s) (100 mL/Hr) IV Continuous <Continuous>  dextrose 5%. 1000 milliLiter(s) (50 mL/Hr) IV Continuous <Continuous>  dextrose 50% Injectable 25 Gram(s) IV Push once  dextrose 50% Injectable 12.5 Gram(s) IV Push once  dextrose 50% Injectable 25 Gram(s) IV Push once  fluocinonide 0.05% Solution 1 Application(s) Topical two times a day  glucagon  Injectable 1 milliGRAM(s) IntraMuscular once  heparin   Injectable 5000 Unit(s) SubCutaneous every 12 hours  insulin lispro (ADMELOG) corrective regimen sliding scale   SubCutaneous three times a day before meals  insulin lispro (ADMELOG) corrective regimen sliding scale   SubCutaneous at bedtime  latanoprost 0.005% Ophthalmic Solution 1 Drop(s) Both EYES at bedtime  levothyroxine 100 MICROGram(s) Oral daily  losartan 100 milliGRAM(s) Oral daily  meclizine 25 milliGRAM(s) Oral every 8 hours  pantoprazole    Tablet 40 milliGRAM(s) Oral before breakfast  senna 2 Tablet(s) Oral at bedtime  sodium chloride 1 Gram(s) Oral three times a day  sodium chloride 1.5%. 500 milliLiter(s) (50 mL/Hr) IV Continuous <Continuous>    VITALS:  T(F): 97.5 (04-15-23 @ 11:27), Max: 98.3 (23 @ 19:19)  HR: 69 (04-15-23 @ 11:27)  BP: 108/67 (04-15-23 @ 11:27)  RR: 18 (04-15-23 @ 11:27)  SpO2: 100% (04-15-23 @ 11:27)  Wt(kg): --     @ 07:01  -  04-15 @ 07:00  --------------------------------------------------------  IN: 720 mL / OUT: 0 mL / NET: 720 mL    04-15 @ 07:01  -  04-15 @ 16:33  --------------------------------------------------------  IN: 480 mL / OUT: 0 mL / NET: 480 mL      PHYSICAL EXAM:  Constitutional: NAD  HEENT: anicteric sclera  Neck: No JVD  Respiratory: CTAB, no wheezes, rales or rhonchi  Cardiovascular: S1, S2, RRR  Gastrointestinal: BS+, soft, NT/ND  Extremities: no pedal edema b/l   Neurological: A/O x 3  Psychiatric: Normal mood, normal affect  : No beckwith.     LABS:  04-15    127<L>  |  93<L>  |  13  ----------------------------<  145<H>  4.6   |  20<L>  |  1.14    Ca    9.4      15 Apr 2023 06:28      Creatinine Trend: 1.14 <--, 1.14 <--, 1.19 <--, 1.20 <--, 1.26 <--, 1.13 <--, 1.19 <--                        8.1    7.07  )-----------( 336      ( 15 Apr 2023 06:29 )             25.7     Urine Studies:  Urinalysis Basic - ( 2023 19:19 )    Color: Colorless / Appearance: Clear / S.016 / pH:   Gluc:  / Ketone: Negative  / Bili: Negative / Urobili: Negative   Blood:  / Protein: Negative / Nitrite: Negative   Leuk Esterase: Negative / RBC:  / WBC    Sq Epi:  / Non Sq Epi:  / Bacteria:       Osmolality, Random Urine: 349 mos/kg ( @ 14:42)  Sodium, Random Urine: 64 mmol/L ( @ 14:42)  Osmolality, Random Urine: 305 mos/kg ( @ 06:41)  Sodium, Random Urine: 56 mmol/L ( @ 06:41)      RADIOLOGY & ADDITIONAL STUDIES:

## 2023-04-15 NOTE — PROGRESS NOTE ADULT - PROBLEM SELECTOR PLAN 1
BMP  Work up in progress   Nephro eval appreciated
Na improving  Nephro f/up appreciated
Na improving  Nephro f/up appreciated
Na 127  Nephro f/up appreciated

## 2023-04-15 NOTE — PROGRESS NOTE ADULT - PROBLEM SELECTOR PLAN 4
- Atorvastatin
- Atorvastatin
- Hold home DM meds ( Glimepiride, Metformin)   - FSSS  - DASH/CC diet
- Atorvastatin

## 2023-04-15 NOTE — PROGRESS NOTE ADULT - PROBLEM SELECTOR PLAN 3
- Amlodipine  - Carvedilol  - Losartan

## 2023-04-15 NOTE — PROGRESS NOTE ADULT - ASSESSMENT
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
77y F pmh htn, dm, hld presents for dizziness (room spinning)  x 3 weeks and inability to ambulate, found to have hyponatremia admitted for further care.    Dye allergy :    Derm eval appreciated  
77y F pmh htn, dm, hld presents for dizziness (room spinning)  x 3 weeks and inability to ambulate, found to have hyponatremia admitted for further care.    Dye allergy :    Derm eval  Lesion on head
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
77y F pmh htn, dm, hld presents for dizziness (room spinning)  x 3 weeks and inability to ambulate, found to have hyponatremia admitted for further care
77y F pmh htn, dm, hld presents for c/o dizziness (room spinning)  x 3 weeks with hyponatremia. Renal following for HYponatremia Mx.     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 d/shoaib Norvasc  c/w losartan 100 milliGRAM(s) Oral daily and  carvedilol 12.5 milliGRAM(s) Oral every 12 hours w/holding parameters    if Sr Na better/stable tomorrow, can d/c renal stand point w/outpt f/u in 2-3wks for BMP check  will closely follow up.   poc d/w pt, Dr Persaud  labs, chart reviewed  For any question, pl call:  Nephrology  Cell -[phone]  Office [phone]  Ans Serv [phone]  
77y F pmh htn, dm, hld presents for dizziness (room spinning)  x 3 weeks and inability to ambulate, found to have hyponatremia admitted for further care.    Dye allergy :    Derm eval  Lesion on head

## 2023-04-15 NOTE — PROGRESS NOTE ADULT - PROBLEM SELECTOR PLAN 2
ENT/Neuro eval appreciated  Erika Maldonado PRN
ENT/Neuro f/up  Meclizine  Vallium PRN

## 2023-04-16 ENCOUNTER — TRANSCRIPTION ENCOUNTER (OUTPATIENT)
Age: 78
End: 2023-04-16

## 2023-04-16 VITALS
DIASTOLIC BLOOD PRESSURE: 73 MMHG | HEART RATE: 71 BPM | RESPIRATION RATE: 18 BRPM | TEMPERATURE: 98 F | OXYGEN SATURATION: 99 % | SYSTOLIC BLOOD PRESSURE: 152 MMHG

## 2023-04-16 LAB
ANION GAP SERPL CALC-SCNC: 14 MMOL/L — SIGNIFICANT CHANGE UP (ref 5–17)
BUN SERPL-MCNC: 17 MG/DL — SIGNIFICANT CHANGE UP (ref 7–23)
CALCIUM SERPL-MCNC: 9.4 MG/DL — SIGNIFICANT CHANGE UP (ref 8.4–10.5)
CHLORIDE SERPL-SCNC: 99 MMOL/L — SIGNIFICANT CHANGE UP (ref 96–108)
CO2 SERPL-SCNC: 20 MMOL/L — LOW (ref 22–31)
CREAT SERPL-MCNC: 1.17 MG/DL — SIGNIFICANT CHANGE UP (ref 0.5–1.3)
EGFR: 48 ML/MIN/1.73M2 — LOW
GLUCOSE BLDC GLUCOMTR-MCNC: 208 MG/DL — HIGH (ref 70–99)
GLUCOSE BLDC GLUCOMTR-MCNC: 244 MG/DL — HIGH (ref 70–99)
GLUCOSE SERPL-MCNC: 158 MG/DL — HIGH (ref 70–99)
POTASSIUM SERPL-MCNC: 4.4 MMOL/L — SIGNIFICANT CHANGE UP (ref 3.5–5.3)
POTASSIUM SERPL-SCNC: 4.4 MMOL/L — SIGNIFICANT CHANGE UP (ref 3.5–5.3)
SODIUM SERPL-SCNC: 133 MMOL/L — LOW (ref 135–145)

## 2023-04-16 PROCEDURE — 84132 ASSAY OF SERUM POTASSIUM: CPT

## 2023-04-16 PROCEDURE — 84484 ASSAY OF TROPONIN QUANT: CPT

## 2023-04-16 PROCEDURE — 84443 ASSAY THYROID STIM HORMONE: CPT

## 2023-04-16 PROCEDURE — 71045 X-RAY EXAM CHEST 1 VIEW: CPT

## 2023-04-16 PROCEDURE — 82947 ASSAY GLUCOSE BLOOD QUANT: CPT

## 2023-04-16 PROCEDURE — 80048 BASIC METABOLIC PNL TOTAL CA: CPT

## 2023-04-16 PROCEDURE — 70496 CT ANGIOGRAPHY HEAD: CPT | Mod: MA

## 2023-04-16 PROCEDURE — 81003 URINALYSIS AUTO W/O SCOPE: CPT

## 2023-04-16 PROCEDURE — 70450 CT HEAD/BRAIN W/O DYE: CPT | Mod: MA

## 2023-04-16 PROCEDURE — 85018 HEMOGLOBIN: CPT

## 2023-04-16 PROCEDURE — 83880 ASSAY OF NATRIURETIC PEPTIDE: CPT

## 2023-04-16 PROCEDURE — 99285 EMERGENCY DEPT VISIT HI MDM: CPT | Mod: 25

## 2023-04-16 PROCEDURE — 86803 HEPATITIS C AB TEST: CPT

## 2023-04-16 PROCEDURE — 36415 COLL VENOUS BLD VENIPUNCTURE: CPT

## 2023-04-16 PROCEDURE — 80053 COMPREHEN METABOLIC PANEL: CPT

## 2023-04-16 PROCEDURE — 70498 CT ANGIOGRAPHY NECK: CPT | Mod: MA

## 2023-04-16 PROCEDURE — 83930 ASSAY OF BLOOD OSMOLALITY: CPT

## 2023-04-16 PROCEDURE — 97161 PT EVAL LOW COMPLEX 20 MIN: CPT

## 2023-04-16 PROCEDURE — 83935 ASSAY OF URINE OSMOLALITY: CPT

## 2023-04-16 PROCEDURE — 82330 ASSAY OF CALCIUM: CPT

## 2023-04-16 PROCEDURE — 83605 ASSAY OF LACTIC ACID: CPT

## 2023-04-16 PROCEDURE — 82803 BLOOD GASES ANY COMBINATION: CPT

## 2023-04-16 PROCEDURE — 85025 COMPLETE CBC W/AUTO DIFF WBC: CPT

## 2023-04-16 PROCEDURE — 85014 HEMATOCRIT: CPT

## 2023-04-16 PROCEDURE — 85610 PROTHROMBIN TIME: CPT

## 2023-04-16 PROCEDURE — 84295 ASSAY OF SERUM SODIUM: CPT

## 2023-04-16 PROCEDURE — 84300 ASSAY OF URINE SODIUM: CPT

## 2023-04-16 PROCEDURE — 82435 ASSAY OF BLOOD CHLORIDE: CPT

## 2023-04-16 PROCEDURE — 82962 GLUCOSE BLOOD TEST: CPT

## 2023-04-16 PROCEDURE — 87637 SARSCOV2&INF A&B&RSV AMP PRB: CPT

## 2023-04-16 PROCEDURE — 85027 COMPLETE CBC AUTOMATED: CPT

## 2023-04-16 PROCEDURE — 83735 ASSAY OF MAGNESIUM: CPT

## 2023-04-16 PROCEDURE — 83036 HEMOGLOBIN GLYCOSYLATED A1C: CPT

## 2023-04-16 RX ORDER — AMLODIPINE BESYLATE 2.5 MG/1
1 TABLET ORAL
Qty: 0 | Refills: 0 | DISCHARGE

## 2023-04-16 RX ORDER — SENNA PLUS 8.6 MG/1
2 TABLET ORAL
Qty: 0 | Refills: 0 | DISCHARGE
Start: 2023-04-16

## 2023-04-16 RX ORDER — SODIUM CHLORIDE 9 MG/ML
1 INJECTION INTRAMUSCULAR; INTRAVENOUS; SUBCUTANEOUS
Qty: 90 | Refills: 0
Start: 2023-04-16 | End: 2023-05-15

## 2023-04-16 RX ORDER — FLUOCINONIDE/EMOLLIENT BASE 0.05 %
1 CREAM (GRAM) TOPICAL
Qty: 60 | Refills: 0
Start: 2023-04-16 | End: 2023-04-27

## 2023-04-16 RX ORDER — CHLORTHALIDONE 50 MG
1 TABLET ORAL
Refills: 0 | DISCHARGE

## 2023-04-16 RX ORDER — MECLIZINE HCL 12.5 MG
1 TABLET ORAL
Qty: 21 | Refills: 0
Start: 2023-04-16 | End: 2023-04-22

## 2023-04-16 RX ADMIN — Medication 4 UNIT(S): at 12:52

## 2023-04-16 RX ADMIN — Medication 1 DROP(S): at 05:17

## 2023-04-16 RX ADMIN — CARVEDILOL PHOSPHATE 12.5 MILLIGRAM(S): 80 CAPSULE, EXTENDED RELEASE ORAL at 05:17

## 2023-04-16 RX ADMIN — Medication 4 UNIT(S): at 09:04

## 2023-04-16 RX ADMIN — LOSARTAN POTASSIUM 100 MILLIGRAM(S): 100 TABLET, FILM COATED ORAL at 05:17

## 2023-04-16 RX ADMIN — Medication 500 MILLIGRAM(S): at 12:06

## 2023-04-16 RX ADMIN — Medication 25 MILLIGRAM(S): at 05:18

## 2023-04-16 RX ADMIN — Medication 2: at 12:52

## 2023-04-16 RX ADMIN — PANTOPRAZOLE SODIUM 40 MILLIGRAM(S): 20 TABLET, DELAYED RELEASE ORAL at 05:17

## 2023-04-16 RX ADMIN — HEPARIN SODIUM 5000 UNIT(S): 5000 INJECTION INTRAVENOUS; SUBCUTANEOUS at 05:18

## 2023-04-16 RX ADMIN — SODIUM CHLORIDE 1 GRAM(S): 9 INJECTION INTRAMUSCULAR; INTRAVENOUS; SUBCUTANEOUS at 13:19

## 2023-04-16 RX ADMIN — Medication 1 DROP(S): at 13:18

## 2023-04-16 RX ADMIN — SODIUM CHLORIDE 1 GRAM(S): 9 INJECTION INTRAMUSCULAR; INTRAVENOUS; SUBCUTANEOUS at 05:18

## 2023-04-16 RX ADMIN — Medication 25 MILLIGRAM(S): at 13:18

## 2023-04-16 RX ADMIN — Medication 81 MILLIGRAM(S): at 12:07

## 2023-04-16 RX ADMIN — PREGABALIN 1000 MICROGRAM(S): 225 CAPSULE ORAL at 12:06

## 2023-04-16 RX ADMIN — Medication 100 MICROGRAM(S): at 05:17

## 2023-04-16 RX ADMIN — Medication 1 APPLICATION(S): at 05:18

## 2023-04-16 RX ADMIN — Medication 2: at 09:04

## 2023-04-16 NOTE — DISCHARGE NOTE PROVIDER - NSDCFUADDAPPT_GEN_ALL_CORE_FT
APPTS ARE READY TO BE MADE: [x ] YES    Best Family or Patient Contact (if needed):    Additional Information about above appointments (if needed):    1:   2:   3:     Other comments or requests:    APPTS ARE READY TO BE MADE: [x ] YES    Best Family or Patient Contact (if needed):    Additional Information about above appointments (if needed):    1:   2:   3:     Other comments or requests:   Pt is brien with Dr. Vidal on 4/20 at 5:30p, [place] location.    Patient was provided with follow up request details and was advised to call to schedule follow up within specified time frame. No scheduling assistance is needed at this time.

## 2023-04-16 NOTE — DISCHARGE NOTE PROVIDER - NSDCCPCAREPLAN_GEN_ALL_CORE_FT
PRINCIPAL DISCHARGE DIAGNOSIS  Diagnosis: Dizziness  Assessment and Plan of Treatment: - Seen by neurology and ENT  -Based on clinical history and physical exam ddx include vestibular neuronitis or labyrinthitis but given that symptoms of hearing loss occurred 1 month ago. Steroid treatment with provide little benefit. This was explain to patient and she understood reasoning.  - Given her symptoms improve with meclizine and valium can continue PRN as per primary but would advise to only use when symptoms are intolerable as the medication can inhibit brain from adjusting to its new normal  - Follow up outpatient with Dr. Lee/Nikki/Corey/Emerson [phone] for formal audiogram within 1 week  - Fall precaution and vestibular rehab        SECONDARY DISCHARGE DIAGNOSES  Diagnosis: Hyponatremia  Assessment and Plan of Treatment: - Seen by nephro  - Na improved to 133 on salt tab  - Continue salt tab 1g three times a day  - Need outpatient follow up with nephro in 2-3 weeks to repeat the lab    Diagnosis: LPRD (laryngopharyngeal reflux disease)  Assessment and Plan of Treatment: - recommend PPI  - reflux precautions (avoid spicy foods and caffeine, sit upright for 3 hours after eating, etc)     PRINCIPAL DISCHARGE DIAGNOSIS  Diagnosis: Dizziness  Assessment and Plan of Treatment: - Seen by neurology and ENT  -Based on clinical history and physical exam ddx include vestibular neuronitis or labyrinthitis but given that symptoms of hearing loss occurred 1 month ago. Steroid treatment with provide little benefit. This was explain to patient and she understood reasoning.  - Given her symptoms improve with meclizine and valium can continue PRN as per primary but would advise to only use when symptoms are intolerable as the medication can inhibit brain from adjusting to its new normal  - Follow up outpatient with Dr. Lee/Nikki/Corey/Emerson [phone] for formal audiogram within 1 week  - Fall precaution and vestibular rehab        SECONDARY DISCHARGE DIAGNOSES  Diagnosis: Hyponatremia  Assessment and Plan of Treatment: - Seen by nephro  - Na improved to 133 on salt tab  - Continue salt tab 1g three times a day  - Need outpatient follow up with nephro in 2-3 weeks to repeat the lab    Diagnosis: LPRD (laryngopharyngeal reflux disease)  Assessment and Plan of Treatment: - recommend PPI  - reflux precautions (avoid spicy foods and caffeine, sit upright for 3 hours after eating, etc)    Diagnosis: Dysesthesia of scalp  Assessment and Plan of Treatment: # Scalp dysesthesia, likely in the setting of recent scalp inflammation after hair dye use  - Would ideally recommend fluocinonide 0.05% solution to be applied BID to AAs on scalp for up to 2 weeks on, one week off. If unavailable to obtain through pharmacy, may start clobetasol 0.05% ointment BID for up to 2 weeks on, 1 week off. Explained that this condition can be neuropathic in etiology; therefore, treatment with topical steroids may or may not be helpful. Should continue to improve over time  - In 2-4 weeks, patient can follow up with us in the [facility] located at [address] [place] [address]. [address], [place] [address] upon discharge. Office phone number is [phone].     PRINCIPAL DISCHARGE DIAGNOSIS  Diagnosis: Dizziness  Assessment and Plan of Treatment: - Seen by neurology and ENT  -Based on clinical history and physical exam ddx include vestibular neuronitis or labyrinthitis but given that symptoms of hearing loss occurred 1 month ago. Steroid treatment with provide little benefit. This was explain to patient and she understood reasoning.  - Given her symptoms improve with meclizine and valium can continue PRN as per primary but would advise to only use when symptoms are intolerable as the medication can inhibit brain from adjusting to its new normal  - Follow up outpatient with Dr. Lee/Nikki/Corey/Emerson [phone] for formal audiogram within 1 week  - Fall precaution and vestibular rehab        SECONDARY DISCHARGE DIAGNOSES  Diagnosis: Hyponatremia  Assessment and Plan of Treatment: - Seen by nephro  - Na improved to 133 on salt tab  - Continue salt tab 1g three times a day  - Need outpatient follow up with nephro in 2-3 weeks to repeat the lab    Diagnosis: LPRD (laryngopharyngeal reflux disease)  Assessment and Plan of Treatment: - recommend PPI  - reflux precautions (avoid spicy foods and caffeine, sit upright for 3 hours after eating, etc)    Diagnosis: Dysesthesia of scalp  Assessment and Plan of Treatment: # Scalp dysesthesia, likely in the setting of recent scalp inflammation after hair dye use  - Would ideally recommend fluocinonide 0.05% solution to be applied BID to AAs on scalp for up to 2 weeks on, one week off. If unavailable to obtain through pharmacy, may start clobetasol 0.05% ointment BID for up to 2 weeks on, 1 week off. Explained that this condition can be neuropathic in etiology; therefore, treatment with topical steroids may or may not be helpful. Should continue to improve over time  - In 2-4 weeks, patient can follow up with us in the [facility] located at [address] [place] [address]. [address], [place] [address] upon discharge. Office phone number is [phone].    Diagnosis: HTN (hypertension)  Assessment and Plan of Treatment: - Continue Losartan and Coreg  - Amlodipine and Chlorthalidone are on hold   - Outpatient follow up with PCP in1 week   Low salt diet  Activity as tolerated.  Take all medication as prescribed.  Follow up with your medical doctor for routine blood pressure monitoring at your next visit.  Notify your doctor if you have any of the following symptoms:   Dizziness, Lightheadedness, Blurry vision, Headache, Chest pain, Shortness of breath      Diagnosis: DM (diabetes mellitus), type 2  Assessment and Plan of Treatment: - HgA1C this admission 7.0  - Continue home DM medication  Make sure you get your HgA1c checked every three months.  If you take oral diabetes medications, check your blood glucose two times a day.  If you take insulin, check your blood glucose before meals and at bedtime.  It's important not to skip any meals.  Keep a log of your blood glucose results and always take it with you to your doctor appointments.  Keep a list of your current medications including injectables and over the counter medications and bring this medication list with you to all your doctor appointments.  If you have not seen your ophthalmologist this year call for appointment.  Check your feet daily for redness, sores, or openings. Do not self treat. If no improvement in two days call your primary care physician for an appointment.  Low blood sugar (hypoglycemia) is a blood sugar below 70mg/dl. Check your blood sugar if you feel signs/symptoms of hypoglycemia. If your blood sugar is below 70 take 15 grams of carbohydrates (ex 4 oz of apple juice, 3-4 glucose tablets, or 4-6 oz of regular soda) wait 15 minutes and repeat blood sugar to make sure it comes up above 70.  If your blood sugar is above 70 and you are due for a meal, have a meal.  If you are not due for a meal have a snack.  This snack helps keeps your blood sugar at a safe range.       PRINCIPAL DISCHARGE DIAGNOSIS  Diagnosis: Dizziness  Assessment and Plan of Treatment: - Seen by neurology and ENT  -Based on clinical history and physical exam ddx include vestibular neuronitis or labyrinthitis but given that symptoms of hearing loss occurred 1 month ago. Steroid treatment with provide little benefit. This was explain to patient and she understood reasoning.  - Given her symptoms improve with meclizine and valium can continue PRN as per primary but would advise to only use when symptoms are intolerable as the medication can inhibit brain from adjusting to its new normal  - Follow up outpatient with Dr. Lee/Nikki/Corey/Emerson [phone] for formal audiogram within 1 week  - Fall precaution   - Outpatient PT and vestibular rehab        SECONDARY DISCHARGE DIAGNOSES  Diagnosis: Hyponatremia  Assessment and Plan of Treatment: - Seen by nephro  - Na improved to 133 on salt tab  - Continue salt tab 1g three times a day  - Need outpatient follow up with nephro in 2-3 weeks to repeat the lab    Diagnosis: LPRD (laryngopharyngeal reflux disease)  Assessment and Plan of Treatment: - recommend PPI  - reflux precautions (avoid spicy foods and caffeine, sit upright for 3 hours after eating, etc)    Diagnosis: Dysesthesia of scalp  Assessment and Plan of Treatment: # Scalp dysesthesia, likely in the setting of recent scalp inflammation after hair dye use  - Would ideally recommend fluocinonide 0.05% solution to be applied BID to AAs on scalp for up to 2 weeks on, one week off. If unavailable to obtain through pharmacy, may start clobetasol 0.05% ointment BID for up to 2 weeks on, 1 week off. Explained that this condition can be neuropathic in etiology; therefore, treatment with topical steroids may or may not be helpful. Should continue to improve over time  - In 2-4 weeks, patient can follow up with us in the [facility] located at [address] [place] [address]. [address], [place] [address] upon discharge. Office phone number is [phone].    Diagnosis: HTN (hypertension)  Assessment and Plan of Treatment: - Continue Losartan and Coreg  - Amlodipine and Chlorthalidone are on hold   - Outpatient follow up with PCP in1 week   Low salt diet  Activity as tolerated.  Take all medication as prescribed.  Follow up with your medical doctor for routine blood pressure monitoring at your next visit.  Notify your doctor if you have any of the following symptoms:   Dizziness, Lightheadedness, Blurry vision, Headache, Chest pain, Shortness of breath      Diagnosis: DM (diabetes mellitus), type 2  Assessment and Plan of Treatment: - HgA1C this admission 7.0  - Continue home DM medication  Make sure you get your HgA1c checked every three months.  If you take oral diabetes medications, check your blood glucose two times a day.  If you take insulin, check your blood glucose before meals and at bedtime.  It's important not to skip any meals.  Keep a log of your blood glucose results and always take it with you to your doctor appointments.  Keep a list of your current medications including injectables and over the counter medications and bring this medication list with you to all your doctor appointments.  If you have not seen your ophthalmologist this year call for appointment.  Check your feet daily for redness, sores, or openings. Do not self treat. If no improvement in two days call your primary care physician for an appointment.  Low blood sugar (hypoglycemia) is a blood sugar below 70mg/dl. Check your blood sugar if you feel signs/symptoms of hypoglycemia. If your blood sugar is below 70 take 15 grams of carbohydrates (ex 4 oz of apple juice, 3-4 glucose tablets, or 4-6 oz of regular soda) wait 15 minutes and repeat blood sugar to make sure it comes up above 70.  If your blood sugar is above 70 and you are due for a meal, have a meal.  If you are not due for a meal have a snack.  This snack helps keeps your blood sugar at a safe range.

## 2023-04-16 NOTE — DISCHARGE NOTE PROVIDER - NSDCQMAMI_CARD_ALL_CORE
No Retinoid Dermatitis Aggressive Treatment: I recommended more frequent application of Cetaphil or CeraVe to the areas of dermatitis. I also prescribed a topical steroid for twice daily use until the dermatitis resolves.

## 2023-04-16 NOTE — DISCHARGE NOTE NURSING/CASE MANAGEMENT/SOCIAL WORK - NSPROMEDSRETURNEDYESNO_GEN_A_NUR
pt states her medications were never taken away from her, her son took them home when she was admitted to the hospital this time

## 2023-04-16 NOTE — DISCHARGE NOTE NURSING/CASE MANAGEMENT/SOCIAL WORK - NSDCPEFALRISK_GEN_ALL_CORE
For information on Fall & Injury Prevention, visit: [email or web address] OR  [email or web address] OR  https://www.cdc.gov/steadi/patient.html

## 2023-04-16 NOTE — DISCHARGE NOTE NURSING/CASE MANAGEMENT/SOCIAL WORK - PATIENT PORTAL LINK FT
You can access the FollowMyHealth Patient Portal offered by [facility] by registering at the following website: [email or web address]. By joining [facility]’s FollowMyHealth portal, you will also be able to view your health information using other applications (apps) compatible with our system.

## 2023-04-16 NOTE — DISCHARGE NOTE PROVIDER - CARE PROVIDERS DIRECT ADDRESSES
[email or web address],DirectAddress_Unknown

## 2023-04-16 NOTE — DISCHARGE NOTE PROVIDER - CARE PROVIDER_API CALL
Young Pesraud)  Internal Medicine  [address] [facility], [address]st [place] [address]  Phone: [phone]  Fax: [phone]  Follow Up Time: 1 week    Brii Jordan)  Otolaryngology  [address] [place] [address], [address]  [place] [address]  Phone: [phone]  Fax: [phone]  Follow Up Time: 1 week    Erick Shearer)  Medicine  Dermatology  [address] [facility] [address]  [place] [address]  Phone: [phone]  Fax: [phone]  Follow Up Time: 2 weeks    Alec Blunt)  Internal Medicine; Nephrology  [address] [place] [address]  Phone: [phone]  Fax: [phone]  Follow Up Time: 2 weeks

## 2023-04-16 NOTE — DISCHARGE NOTE PROVIDER - NSDCFUSCHEDAPPT_GEN_ALL_CORE_FT
[facility] Physician [facility]  CARDIOLOGY [address]  Scheduled Appointment: 04/25/2023

## 2023-04-16 NOTE — DISCHARGE NOTE PROVIDER - PROVIDER TOKENS
PROVIDER:[TOKEN:[[id number]],FOLLOWUP:[1 week]],PROVIDER:[TOKEN:[[id number]],FOLLOWUP:[1 week]],PROVIDER:[TOKEN:[[id number]],FOLLOWUP:[2 weeks]],PROVIDER:[TOKEN:[[id number]],FOLLOWUP:[2 weeks]]

## 2023-04-16 NOTE — DISCHARGE NOTE PROVIDER - NSDCMRMEDTOKEN_GEN_ALL_CORE_FT
amLODIPine 10 mg oral tablet: 1 tab(s) orally once a day  Aspirin Enteric Coated 81 mg oral delayed release tablet: 1 tab(s) orally once a day  atorvastatin 10 mg oral tablet: 1 tab(s) orally once a day  carvedilol 12.5 mg oral tablet: 1 orally 2 times a day  chlorthalidone 25 mg oral tablet: 1 orally once a day  glimepiride 2 mg oral tablet: 1-2 tab(s) orally once a day  latanoprost 0.005% ophthalmic solution: 1 drop(s) to each affected eye once a day (in the evening)  levothyroxine 100 mcg (0.1 mg) oral capsule: 1 orally once a day  Liquitears preserved ophthalmic solution: 1 drop(s) to each affected eye 3 times a day, As Needed  losartan 100 mg oral tablet: 1 orally once a day  metFORMIN 850 mg oral tablet: 1 tab(s) orally 2 times a day, Restart on 11/3  pantoprazole 40 mg oral delayed release tablet: 1 orally once a day  Rolling Walker: Use as directed  Vitamin B12 1000 mcg oral tablet: 1 tab(s) orally once a day  Vitamin C 500 mg oral tablet: 1 tab(s) orally once a day   Aspirin Enteric Coated 81 mg oral delayed release tablet: 1 tab(s) orally once a day  atorvastatin 10 mg oral tablet: 1 tab(s) orally once a day  carvedilol 12.5 mg oral tablet: 1 orally 2 times a day  fluocinonide 0.05% topical solution: Apply topically to affected area 2 times a day  glimepiride 2 mg oral tablet: 1-2 tab(s) orally once a day  latanoprost 0.005% ophthalmic solution: 1 drop(s) to each affected eye once a day (in the evening)  levothyroxine 100 mcg (0.1 mg) oral capsule: 1 orally once a day  Liquitears preserved ophthalmic solution: 1 drop(s) to each affected eye 3 times a day, As Needed  losartan 100 mg oral tablet: 1 orally once a day  meclizine 25 mg oral tablet: 1 tab(s) orally every 8 hours as needed for  dizziness  metFORMIN 850 mg oral tablet: 1 tab(s) orally 2 times a day, Restart on 11/3  pantoprazole 40 mg oral delayed release tablet: 1 orally once a day  Physical Therapy: Assessment and Treatment, dizziness ICD10: R42  senna leaf extract oral tablet: 2 tab(s) orally once a day (at bedtime)  sodium chloride 1 g oral tablet: 1 tab(s) orally 3 times a day  Vestibular Rehab: dizziness ICD10: R42  Vitamin B12 1000 mcg oral tablet: 1 tab(s) orally once a day  Vitamin C 500 mg oral tablet: 1 tab(s) orally once a day   Aspirin Enteric Coated 81 mg oral delayed release tablet: 1 tab(s) orally once a day  atorvastatin 10 mg oral tablet: 1 tab(s) orally once a day  carvedilol 12.5 mg oral tablet: 1 orally 2 times a day  fluocinonide 0.05% topical solution: Apply topically to affected area 2 times a day  glimepiride 2 mg oral tablet: 1-2 tab(s) orally once a day  latanoprost 0.005% ophthalmic solution: 1 drop(s) to each affected eye once a day (in the evening)  levothyroxine 100 mcg (0.1 mg) oral capsule: 1 orally once a day  Liquitears preserved ophthalmic solution: 1 drop(s) to each affected eye 3 times a day, As Needed  losartan 100 mg oral tablet: 1 orally once a day  meclizine 25 mg oral tablet: 1 tab(s) orally every 8 hours as needed for  dizziness  metFORMIN 850 mg oral tablet: 1 tab(s) orally 2 times a day, Restart on 11/3  pantoprazole 40 mg oral delayed release tablet: 1 orally once a day  Physical Therapy: Assessment and Treatment, dizziness ICD10: R42  Physical therapy: Assessment and treat, dizziness R42  senna leaf extract oral tablet: 2 tab(s) orally once a day (at bedtime)  sodium chloride 1 g oral tablet: 1 tab(s) orally 3 times a day  Vestibular Rehab: dizziness ICD10: R42  Vitamin B12 1000 mcg oral tablet: 1 tab(s) orally once a day  Vitamin C 500 mg oral tablet: 1 tab(s) orally once a day

## 2023-04-16 NOTE — DISCHARGE NOTE PROVIDER - HOSPITAL COURSE
77 year old right handed Congolese  woman with a PMHx significant of vascular risk factors and recent history of scalp infection  DM who was admitted due to the dizziness. During my assessment, patient was back to baseline, confirmed that right ear pain as well as difficulty in hearing. No focal deficit. Etiology of dizziness is most likely due to the peripheral etiology. Clinically less suspicious for central etiology. Continue Meclizine for Dizziness. Patient also seen by neurology for dizziness, ENT ddx include vestibular neuronitis or labyrinthitis but given that symptoms of hearing loss occurred 1 month ago. Steroid treatment with provide little benefit. This was explain to patient and she understood reasoning.  - Given her symptoms improve with meclizine and valium can continue PRN as per primary but would advise to only use when symptoms are intolerable as the medication can inhibit brain from adjusting to its new normal. ENT recs o/p f/u for formal audiogram within 1 week.    Pt seen by nephro for hyponatremia, placed on salt tab and Na level improved to 133.     Pt is medically stable and cleared to dc home w/ close outpatient follow up. DC note and med rec reviewed with Dr. Persaud. 77 year old right handed [place]yanese  woman with a PMHx significant of vascular risk factors and recent history of scalp infection  DM who was admitted due to the dizziness. During my assessment, patient was back to baseline, confirmed that right ear pain as well as difficulty in hearing. No focal deficit. Etiology of dizziness is most likely due to the peripheral etiology. Clinically less suspicious for central etiology. Continue Meclizine for Dizziness. Patient also seen by neurology for dizziness, ENT ddx include vestibular neuronitis or labyrinthitis but given that symptoms of hearing loss occurred 1 month ago. Steroid treatment with provide little benefit. This was explain to patient and she understood reasoning.  - Given her symptoms improve with meclizine and valium can continue PRN as per primary but would advise to only use when symptoms are intolerable as the medication can inhibit brain from adjusting to its new normal. ENT recs o/p f/u for formal audiogram within 1 week.    Pt seen by derm for  Scalp dysesthesia, started on steroids.  Pt seen by nephro for hyponatremia, placed on salt tab and Na level improved to 133.     Pt is medically stable and cleared to dc home w/ close outpatient follow up. DC note and med rec reviewed with Dr. Persaud.

## 2023-04-17 NOTE — CHART NOTE - NSCHARTNOTEFT_GEN_A_CORE
Patient was advised of follow up requests and timeframe on 04/17. Patient will return call when ready to schedule.

## 2023-04-18 ENCOUNTER — APPOINTMENT (OUTPATIENT)
Dept: OTOLARYNGOLOGY | Facility: CLINIC | Age: 78
End: 2023-04-18

## 2023-04-25 ENCOUNTER — NON-APPOINTMENT (OUTPATIENT)
Age: 78
End: 2023-04-25

## 2023-04-25 ENCOUNTER — APPOINTMENT (OUTPATIENT)
Dept: CARDIOLOGY | Facility: HOSPITAL | Age: 78
End: 2023-04-25

## 2023-04-25 VITALS
HEART RATE: 76 BPM | HEIGHT: 60 IN | DIASTOLIC BLOOD PRESSURE: 72 MMHG | OXYGEN SATURATION: 100 % | SYSTOLIC BLOOD PRESSURE: 158 MMHG | BODY MASS INDEX: 26.11 KG/M2 | WEIGHT: 133 LBS

## 2023-04-25 RX ORDER — CARVEDILOL 25 MG/1
25 TABLET, FILM COATED ORAL TWICE DAILY
Qty: 60 | Refills: 5 | Status: DISCONTINUED | COMMUNITY
Start: 2022-07-26 | End: 2023-04-25

## 2023-04-25 RX ORDER — CHLORTHALIDONE 25 MG/1
25 TABLET ORAL
Qty: 30 | Refills: 3 | Status: DISCONTINUED | COMMUNITY
Start: 2022-04-26 | End: 2023-04-25

## 2023-04-26 NOTE — ASSESSMENT
[FreeTextEntry1] : 77yo woman with PMH of HTN, HLD, DMT2, hypothyroidism, GERD, glaucoma presenting for cardiology follow up appointment. Patient's primary issue is hypertension management. \par \par #HTN\par - Continue carvedilol 12.5 mg BID\par - Continue losartan 100 mg daily \par - Discontinue chlorthalidone indefinitely (had hyponatremia), will follow up sodium level from PCP\par - Suspect component of white coat hypertension as patient has mostly at goal BPs at home\par \par #HLD\par - Continue simvastatin 5 mg daily \par \par #DM2 and glaucoma \par - Continue follow up with PCP\par \par #Iron def anemia\par - Continue oral iron per PCP\par - Encouraged intake of leafy greens rich in iron\par \par #Constipation, improved \par - Continue senna per PCP\par - Continue colace 100 mg PO BID\par \par RTC 6 months\par \par Case discussed with clinic attending \par \par Tuan Pedraza MD\par Cardiology Fellow, PGY5

## 2023-04-26 NOTE — PHYSICAL EXAM
[Well Developed] : well developed [Well Nourished] : well nourished [No Acute Distress] : no acute distress [Normal Conjunctiva] : normal conjunctiva [Normal Venous Pressure] : normal venous pressure [No Carotid Bruit] : no carotid bruit [Normal S1, S2] : normal S1, S2 [No Murmur] : no murmur [No Rub] : no rub [No Gallop] : no gallop [Clear Lung Fields] : clear lung fields [Good Air Entry] : good air entry [No Respiratory Distress] : no respiratory distress  [Soft] : abdomen soft [Non Tender] : non-tender [No Masses/organomegaly] : no masses/organomegaly [Normal Bowel Sounds] : normal bowel sounds [Normal Gait] : normal gait [No Edema] : no edema [No Cyanosis] : no cyanosis [No Clubbing] : no clubbing [No Varicosities] : no varicosities [No Rash] : no rash [No Skin Lesions] : no skin lesions [Moves all extremities] : moves all extremities [No Focal Deficits] : no focal deficits [Normal Speech] : normal speech [Alert and Oriented] : alert and oriented [Normal memory] : normal memory [de-identified] : No carotid bruits [de-identified] : No renal artery bruits

## 2023-04-26 NOTE — HISTORY OF PRESENT ILLNESS
[FreeTextEntry1] : PCP: Dr. JENNIFER Persaud ([phone])\par \par 75yo woman with PMH of HTN, HLD, DMT2, hypothyroidism, GERD, glaucoma presenting for follow up for HTN.  \par \par Last visit patient was hypertensive to 169/72. However, patient presented home BP log with BPs at goal or slightly above goal: \par Jan 19th 115/60 (AM) and 115/58 (PM)\par Jan 20th 110/42 (AM) and 148/46 (PM)\par Jan 21st 144/59 (AM) and 139/55 (PM)\par Jan 22nd 122/53 (AM) and 118/55 (PM)\par \par Manual blood pressure was 134/52. No adjustments were made to antihypertensives. \par \par Patient was recently admitted to [facility] 4/11/2023-4/16/2023 for dizziness. Low suspicion central process, was prescribed Meclizine with improvement. Found to have hyponatremia (Na 121) for which chlorthalidone was discontinued. Carvedilol was decreased from 25 mg BID to 12.5 mg BID. \par \par Today patient is HTNsive to /72. However she has daily BP readings from home ranging from -143 since discharge. Patient had metabolic profile done at PCP yesterday (results pending). Now takes meclizine PRN. \par \par \par

## 2023-10-31 ENCOUNTER — NON-APPOINTMENT (OUTPATIENT)
Age: 78
End: 2023-10-31

## 2023-10-31 ENCOUNTER — APPOINTMENT (OUTPATIENT)
Dept: CARDIOLOGY | Facility: HOSPITAL | Age: 78
End: 2023-10-31

## 2023-10-31 VITALS
HEIGHT: 60 IN | SYSTOLIC BLOOD PRESSURE: 166 MMHG | TEMPERATURE: 98 F | HEART RATE: 83 BPM | DIASTOLIC BLOOD PRESSURE: 69 MMHG | WEIGHT: 133 LBS | OXYGEN SATURATION: 99 % | BODY MASS INDEX: 26.11 KG/M2

## 2023-10-31 NOTE — H&P ADULT - REASON FOR ADMISSION
MSW phoned [facility] at [phone] and spoke with Lara Gomez who informed that patient is active with [facility] dizziness

## 2023-12-13 NOTE — ED ADULT NURSE NOTE - NSSEPSISSUSPECTED_ED_A_ED
Prescription request was sent to the office by the patient's pharmacy and reviewed and signed by the provider and faxed back to [phone]
No

## 2024-01-14 NOTE — ED ADULT NURSE NOTE - CHIEF COMPLAINT QUOTE
pt with dizziness for approx 3 weeks being tx by md for vertigo son states fall yesterday x 2 and today x 2 pt on meclezine gabapentin and meloxicam slight left droop noted son states unknown amt of time present Warm

## 2024-01-23 ENCOUNTER — NON-APPOINTMENT (OUTPATIENT)
Age: 79
End: 2024-01-23

## 2024-01-23 ENCOUNTER — APPOINTMENT (OUTPATIENT)
Dept: CARDIOLOGY | Facility: HOSPITAL | Age: 79
End: 2024-01-23

## 2024-01-23 VITALS
DIASTOLIC BLOOD PRESSURE: 68 MMHG | WEIGHT: 136 LBS | OXYGEN SATURATION: 98 % | HEIGHT: 60 IN | SYSTOLIC BLOOD PRESSURE: 171 MMHG | BODY MASS INDEX: 26.7 KG/M2 | HEART RATE: 79 BPM

## 2024-01-23 DIAGNOSIS — I10 ESSENTIAL (PRIMARY) HYPERTENSION: ICD-10-CM

## 2024-01-23 DIAGNOSIS — E11.9 TYPE 2 DIABETES MELLITUS W/OUT COMPLICATIONS: ICD-10-CM

## 2024-01-23 DIAGNOSIS — E78.5 HYPERLIPIDEMIA, UNSPECIFIED: ICD-10-CM

## 2024-01-23 RX ORDER — CARVEDILOL 25 MG/1
25 TABLET, FILM COATED ORAL TWICE DAILY
Qty: 60 | Refills: 5 | Status: ACTIVE | COMMUNITY
Start: 2024-01-23 | End: 1900-01-01

## 2024-01-23 RX ORDER — CARVEDILOL 12.5 MG/1
12.5 TABLET, FILM COATED ORAL TWICE DAILY
Qty: 60 | Refills: 3 | Status: DISCONTINUED | COMMUNITY
Start: 2023-04-25 | End: 2024-01-23

## 2024-01-23 RX ORDER — SIMVASTATIN 5 MG/1
5 TABLET, FILM COATED ORAL
Qty: 90 | Refills: 2 | Status: ACTIVE | COMMUNITY

## 2024-01-23 RX ORDER — LOSARTAN POTASSIUM 100 MG/1
100 TABLET, FILM COATED ORAL DAILY
Qty: 1 | Refills: 3 | Status: ACTIVE | COMMUNITY
Start: 2022-07-26 | End: 1900-01-01

## 2024-01-23 NOTE — PHYSICAL EXAM
[Well Developed] : well developed [Well Nourished] : well nourished [No Acute Distress] : no acute distress [Normal Conjunctiva] : normal conjunctiva [Normal Venous Pressure] : normal venous pressure [No Carotid Bruit] : no carotid bruit [Normal S1, S2] : normal S1, S2 [No Murmur] : no murmur [No Gallop] : no gallop [No Rub] : no rub [Clear Lung Fields] : clear lung fields [Good Air Entry] : good air entry [No Respiratory Distress] : no respiratory distress  [Soft] : abdomen soft [Non Tender] : non-tender [No Masses/organomegaly] : no masses/organomegaly [Normal Bowel Sounds] : normal bowel sounds [Normal Gait] : normal gait [No Edema] : no edema [No Cyanosis] : no cyanosis [No Clubbing] : no clubbing [No Varicosities] : no varicosities [No Rash] : no rash [No Skin Lesions] : no skin lesions [Moves all extremities] : moves all extremities [No Focal Deficits] : no focal deficits [Normal Speech] : normal speech [Alert and Oriented] : alert and oriented [Normal memory] : normal memory [de-identified] : No carotid bruits [de-identified] : No renal artery bruits

## 2024-01-23 NOTE — HISTORY OF PRESENT ILLNESS
[FreeTextEntry1] : 77yo woman with PMH of HTN, HLD, DMT2, hypothyroidism, GERD, glaucoma, and vertigo presenting for follow up for HTN.  Has high BP readings in the office, but is usually normotensive on home automatic BP monitor.   Got bloodwork done few days ago with PCP (Dr. Young Persaud).   Today ECG within normal limits, however hypertensive to 171/68.

## 2024-01-23 NOTE — ASSESSMENT
[FreeTextEntry1] : 79yo woman with PMH of HTN, HLD, DMT2, hypothyroidism, GERD, glaucoma presenting for cardiology follow up appointment. Patient's primary issue is hypertension management.  #HTN - Increase carvedilol 12.5 mg BID to 25 mg BID - Continue losartan 100 mg daily - Discontinue chlorthalidone indefinitely (had hyponatremia) - Suspect component of white coat hypertension  - Patient instructed to measure BP at home each morning and maintain log, given prescription for new automated BP machine.  #HLD - Continue simvastatin 5 mg daily  #DM2 and glaucoma - Continue follow up with PCP  #Iron def anemia - Continue oral iron per PCP - Encouraged intake of leafy greens rich in iron  #Constipation, improved - Continue Senna per PCP - Continue Colace 100 mg PO BID  RTC 3 months for BP follow up.  Case discussed with clinic attending.  Tuan Pedraza MD Cardiology Fellow, PGY6.

## 2024-04-23 ENCOUNTER — APPOINTMENT (OUTPATIENT)
Dept: CARDIOLOGY | Facility: HOSPITAL | Age: 79
End: 2024-04-23

## 2024-07-13 NOTE — PROGRESS NOTE ADULT - PROVIDER SPECIALTY LIST ADULT
Nephrology
Nephrology
Internal Medicine
Nephrology
Internal Medicine
Time-based billing (NON-critical care)
Internal Medicine
Internal Medicine

## 2024-07-23 ENCOUNTER — APPOINTMENT (OUTPATIENT)
Age: 79
End: 2024-07-23
Payer: COMMERCIAL

## 2024-07-23 PROCEDURE — 99203 OFFICE O/P NEW LOW 30 MIN: CPT

## 2024-08-27 ENCOUNTER — APPOINTMENT (OUTPATIENT)
Age: 79
End: 2024-08-27
Payer: COMMERCIAL

## 2024-08-27 PROCEDURE — 99213 OFFICE O/P EST LOW 20 MIN: CPT

## 2025-01-14 ENCOUNTER — NON-APPOINTMENT (OUTPATIENT)
Age: 80
End: 2025-01-14

## 2025-02-25 ENCOUNTER — APPOINTMENT (OUTPATIENT)
Age: 80
End: 2025-02-25

## 2025-07-23 ENCOUNTER — NON-APPOINTMENT (OUTPATIENT)
Age: 80
End: 2025-07-23

## 2025-09-23 PROBLEM — N18.31 STAGE 3A CHRONIC KIDNEY DISEASE: Status: ACTIVE | Noted: 2025-09-23

## 2025-09-24 PROBLEM — E61.1 IRON DEFICIENCY: Status: ACTIVE | Noted: 2025-09-24
